# Patient Record
Sex: MALE | Race: WHITE | NOT HISPANIC OR LATINO | Employment: OTHER | URBAN - METROPOLITAN AREA
[De-identification: names, ages, dates, MRNs, and addresses within clinical notes are randomized per-mention and may not be internally consistent; named-entity substitution may affect disease eponyms.]

---

## 2017-08-29 ENCOUNTER — TRANSFERRED RECORDS (OUTPATIENT)
Dept: HEALTH INFORMATION MANAGEMENT | Facility: CLINIC | Age: 57
End: 2017-08-29

## 2017-09-13 ENCOUNTER — TRANSFERRED RECORDS (OUTPATIENT)
Dept: HEALTH INFORMATION MANAGEMENT | Facility: CLINIC | Age: 57
End: 2017-09-13

## 2018-09-27 ENCOUNTER — TRANSFERRED RECORDS (OUTPATIENT)
Dept: HEALTH INFORMATION MANAGEMENT | Facility: CLINIC | Age: 58
End: 2018-09-27

## 2018-10-23 ENCOUNTER — TRANSFERRED RECORDS (OUTPATIENT)
Dept: HEALTH INFORMATION MANAGEMENT | Facility: CLINIC | Age: 58
End: 2018-10-23

## 2019-08-18 ENCOUNTER — TRANSFERRED RECORDS (OUTPATIENT)
Dept: HEALTH INFORMATION MANAGEMENT | Facility: CLINIC | Age: 59
End: 2019-08-18

## 2019-09-11 ENCOUNTER — TRANSFERRED RECORDS (OUTPATIENT)
Dept: HEALTH INFORMATION MANAGEMENT | Facility: CLINIC | Age: 59
End: 2019-09-11

## 2019-09-12 ENCOUNTER — TRANSFERRED RECORDS (OUTPATIENT)
Dept: HEALTH INFORMATION MANAGEMENT | Facility: CLINIC | Age: 59
End: 2019-09-12

## 2020-01-17 ENCOUNTER — TRANSFERRED RECORDS (OUTPATIENT)
Dept: HEALTH INFORMATION MANAGEMENT | Facility: CLINIC | Age: 60
End: 2020-01-17

## 2020-01-17 ENCOUNTER — MEDICAL CORRESPONDENCE (OUTPATIENT)
Dept: HEALTH INFORMATION MANAGEMENT | Facility: CLINIC | Age: 60
End: 2020-01-17

## 2020-01-28 ENCOUNTER — TRANSCRIBE ORDERS (OUTPATIENT)
Dept: OTHER | Age: 60
End: 2020-01-28

## 2020-01-28 DIAGNOSIS — G62.9 NEUROPATHY: Primary | ICD-10-CM

## 2020-01-31 NOTE — TELEPHONE ENCOUNTER
RECORDS RECEIVED FROM: External   Date of Appt: 5/18/20   NOTES (FOR ALL VISITS) STATUS DETAILS   OFFICE NOTE from referring provider Care Everywhere Dr Tori Aguilar at [facility]:  1/28/20   OFFICE NOTE from other specialist Received Dr Hailey Gtz @ [facility]:  9/11/19 9/27/18    Dr Etienne Perez @ [facility] [place] [place] [facility]:  8/26/19   DISCHARGE SUMMARY from hospital N/A    DISCHARGE REPORT from the ER N/A    OPERATIVE REPORT N/A    MEDICATION LIST Care Everywhere    IMAGING  (FOR ALL VISITS)     EMG Received [facility]:  9/11/19  10/23/18    [facility]:  9/13/17   EEG N/A    MRI (HEAD, NECK, SPINE) Received [facility]:  MRI Lumbar Spine 8/18/19    [facility]:  MRI Cervical Spine 8/29/17   LUMBAR PUNCTURE N/A    [facility] Scan N/A    CT (HEAD, NECK, SPINE) N/A       Action 1/31/20 MV 9.21am   Action Taken Imaging request faxed to [facility] for:  MRI Lumbar Spine 8/18/19     Action 1/31/20 MV 9.21am   Action Taken Imaging request faxed to [facility] for:  MRI Cervical Spine 10/2/18     Phone Call:  1/31/20 MV 9.21am   Contact Name iNcolas García Left voice mail to obtain external medical records from [facility] (EMG)     Action 2/3/20 MV 12.36pm   Action Taken EMG report received via fax. Sent to scanning     Imaging Received  2/3/20 MV 12.38pm  [facility]   Image Type (x): Disc:   PACS: x   Exam Date/Name MRI Lumbar Spine 8/18/19 Comments: images resolved in PACS     Action 2/5/20 MV 8.39am   Action Taken 2nd imaging request faxed to [facility]. [facility] does not have imaging for the DOS listed above.     Imaging Received  2/5/20 MV 9.34am  [facility]   Image Type (x): Disc:   PACS: x   Exam Date/Name MRI Cervical Spine 8/29/17 Comments: images resolved in PACS

## 2020-04-22 ENCOUNTER — DOCUMENTATION ONLY (OUTPATIENT)
Dept: CARE COORDINATION | Facility: CLINIC | Age: 60
End: 2020-04-22

## 2020-05-18 ENCOUNTER — PRE VISIT (OUTPATIENT)
Dept: NEUROLOGY | Facility: CLINIC | Age: 60
End: 2020-05-18

## 2020-05-18 ENCOUNTER — VIRTUAL VISIT (OUTPATIENT)
Dept: NEUROLOGY | Facility: CLINIC | Age: 60
End: 2020-05-18
Attending: FAMILY MEDICINE
Payer: COMMERCIAL

## 2020-05-18 DIAGNOSIS — R20.2 PARESTHESIA OF HAND, BILATERAL: Primary | ICD-10-CM

## 2020-05-18 DIAGNOSIS — M54.12 RIGHT CERVICAL RADICULOPATHY: ICD-10-CM

## 2020-05-18 DIAGNOSIS — M75.112 NONTRAUMATIC PARTIAL BILATERAL ROTATOR CUFF TEAR: ICD-10-CM

## 2020-05-18 DIAGNOSIS — M75.111 NONTRAUMATIC PARTIAL BILATERAL ROTATOR CUFF TEAR: ICD-10-CM

## 2020-05-18 RX ORDER — TRAZODONE HYDROCHLORIDE 50 MG/1
100 TABLET, FILM COATED ORAL AT BEDTIME
COMMUNITY
Start: 2019-07-17

## 2020-05-18 RX ORDER — POTASSIUM CHLORIDE 750 MG/1
10 TABLET, EXTENDED RELEASE ORAL 2 TIMES DAILY
COMMUNITY
Start: 2019-10-03

## 2020-05-18 RX ORDER — FINASTERIDE 5 MG/1
2.5 TABLET, FILM COATED ORAL DAILY
COMMUNITY
Start: 2019-07-12

## 2020-05-18 RX ORDER — SERTRALINE HYDROCHLORIDE 100 MG/1
200 TABLET, FILM COATED ORAL DAILY
COMMUNITY
Start: 2020-01-28

## 2020-05-18 RX ORDER — ZOLPIDEM TARTRATE 5 MG/1
5 TABLET ORAL
COMMUNITY
Start: 2020-04-17 | End: 2020-08-12

## 2020-05-18 RX ORDER — PROPRANOLOL HCL 60 MG
60 CAPSULE, EXTENDED RELEASE 24HR ORAL DAILY
COMMUNITY
Start: 2019-10-03

## 2020-05-18 RX ORDER — LISINOPRIL AND HYDROCHLOROTHIAZIDE 12.5; 2 MG/1; MG/1
1 TABLET ORAL DAILY
COMMUNITY
Start: 2019-11-25

## 2020-05-18 RX ORDER — FOLIC ACID/MULTIVIT,IRON,MINER 0.4MG-18MG
1200 TABLET ORAL DAILY
COMMUNITY

## 2020-05-18 RX ORDER — FENOFIBRATE 160 MG/1
160 TABLET ORAL DAILY
COMMUNITY
Start: 2019-06-27 | End: 2020-11-10

## 2020-05-18 RX ORDER — ALPRAZOLAM 0.25 MG
0.25 TABLET ORAL 2 TIMES DAILY
COMMUNITY
Start: 2020-04-13

## 2020-05-18 RX ORDER — CAFFEINE 200 MG
200 TABLET ORAL DAILY
COMMUNITY

## 2020-05-18 RX ORDER — DEXTROAMPHETAMINE SACCHARATE, AMPHETAMINE ASPARTATE, DEXTROAMPHETAMINE SULFATE AND AMPHETAMINE SULFATE 2.5; 2.5; 2.5; 2.5 MG/1; MG/1; MG/1; MG/1
TABLET ORAL
COMMUNITY
Start: 2020-04-17

## 2020-05-18 NOTE — PROGRESS NOTES
"Nicolas Mason is a 59 year old male who is being evaluated via a billable video visit.      The patient has been notified of following:     \"This video visit will be conducted via a call between you and your physician/provider. We have found that certain health care needs can be provided without the need for an in-person physical exam.  This service lets us provide the care you need with a video conversation.  If a prescription is necessary we can send it directly to your pharmacy.  If lab work is needed we can place an order for that and you can then stop by our lab to have the test done at a later time.    Video visits are billed at different rates depending on your insurance coverage.  Please reach out to your insurance provider with any questions.    If during the course of the call the physician/provider feels a video visit is not appropriate, you will not be charged for this service.\"    Patient has given verbal consent for Video visit? Yes    How would you like to obtain your AVS? Laurenharhugo    Patient would like the video invitation sent by: Send to e-mail at: [email or web address]    Will anyone else be joining your video visit? No        Video-Visit Details    Type of service:  Video Visit    Video Start Time: 12.30 pm  Video End Time: 1.11 pm    Originating Location (pt. Location): Home    Distant Location (provider location):  [facility] NEUROLOGY     Platform used for Video Visit: Jovan Collado MD        "

## 2020-05-18 NOTE — PROGRESS NOTES
"Service Date: 2020      Jackie Guerrero MD   [facility]   [address] [place] [address]      Tori Aguilar MD   [address] [address]      RE: Nicolas Mason   MRN: [id number]   : 1960      Dear Doctors:      I had the pleasure to evaluate Nicolas Mason via a billable video visit today. In person visit was not recommended due to COVID19 pandemic and guidelines about social distancing. Doximity was used.      Mr. Mason is a 59-year-old male who wanted a third neurological opinion after he had multiple EMGs of the upper extremities and evaluation by multiple practitioners for predominantly upper extremity issues.  It was very difficult to get a linear history from Mr. Mason, as he kept on referring, again and again, to the results of his MRIs and EMGs without focusing on my inquiries about the timeline and detailed description of his symptoms, and therefore I had to constantly redirect him.    3 years ago, he had an accident on the treadmill when he fell down.  He landed on his right hip and thigh.  After the accident, he experienced pain or paresthesias emanating from the right neck and scapular area and radiating down the medial arm, forearm and digits 4-5. Lateral neck motion would aggravate this.  About a year later, he experienced what he called \"similar\" symptoms on the left side, although, upon further questioning, they were not the same.  He was describing pain from the left elbow with paresthesias down his left digit 5. Over time, the right-sided pain and paresthesias at the 5th finger dissipated. However, in the last year or two, he is bothered by a burning sensation and major discomfort in the right middle finger, and his whole right hand feels \"on fire\" at times, especially at night.  He wakes up and shakes his hand, but this does not provide significant relief. This latter symptom may be partially " alleviated by raising the arm overhead.  In addition, he has pain at the right elbow laterally, and he has chronic pain in the right more than left shoulder aggravated by passive motion.      Workup done at [place], [place] is as follows:     1. MRI of the shoulder done at [facility] in 09/2018 without contrast, showed moderate full thickness tear of the anterior portion of the distal right supraspinatus tendon, but no associated muscle atrophy.  There was moderate infraspinatus and subscapularis tendinopathy.    2. MRI of the lumbar spine done 08/2019 showed slight retrolisthesis in L5 and moderate spinal stenoses at L3-4 and L5-S1 with mild canal stenosis at L4-5.   3. MRI of the cervical spine done 08/2017 showed quite significant neural foraminal stenosis, severe at right C6-7, moderate at bilateral C4-5 and C5-6, and hypertrophic left C3-4 facet causing severe foraminal stenosis.  There was no spinal cord abnormality.  There was also a moderate compression of the right C8 root due to a 3 mm right medial foraminal C7-T1 disk herniation.    4. 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  There was also chronic mild to moderate right C7>C6 radiculopathies.    5. EMG #2 was done by Dr. Payton in [place] on 10/2018. There was no evidence of right ulnar neuropathy at the elbow. 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   6. A third EMG done in 09/2019, again at [place]. This was at the right lower extremity. Right peroneal and tibial nerves were normal.  Right sural sensory response was normal.  Needle EMG was unremarkable except for mild large long duration units at the right vastus  medialis that was interpreted as femoral neuropathy versus chronic right L4 radiculopathy.  The patient currently does not have any lower extremity complaints.      7. He told me that he had a fourth EMG at [place] of the LEFT upper extremity, the results of which I do not have.     IMPRESSION:  Mr. Mason provides a complex history and it is quite obvious that there is more than one issue in his upper extremities.  He very likely has orthopedic problems due to bilateral rotator cuff tears causing the shoulder pain, and those should be addressed by an orthopedic surgeon. Second, he may have right lateral epicondylitis as there is a tender point at the lateral elbow. Third, regarding neurological issues, I personally believe that his right middle finger paresthesias are most likely due to C7 radiculopathy, because overhead motion provides some partial relief, unlike what would happen with carpal tunnel.  Right C7 radiculopathy was actually previously documented both electrodiagnostically and on MRI.  He does not have any symptoms of right ulnar neuropathy or C8 radiculopathy anymore, because he is currently free of sensory symptoms at digits 4-5.  I do not know if there are any active neurological issues in the left arm.      Obviously, all those things cannot be sorted out by video exam, and therefore, I asked him to come to our clinic to undergo a repeat EMG in 1 month and this will provide me the opportunity to examine him too.  I hope this will provide some closure and the answers he needs.      Thank you for allowing me to participate in his care. Total time spent on video call 41 minutes; start was 12:30 p.m., end 1:11 p.m.; more than half was counseling.     Sincerely,            DAREK CHO MD             D: 2020   T: 2020   MT: kay      Name:     MARISELA MASON   MRN:      [id number]        Account:      [id number]   :      1960           Service Date: 2020       Document: [id number]

## 2020-06-18 ENCOUNTER — OFFICE VISIT (OUTPATIENT)
Dept: NEUROLOGY | Facility: CLINIC | Age: 60
End: 2020-06-18
Attending: PSYCHIATRY & NEUROLOGY
Payer: COMMERCIAL

## 2020-06-18 DIAGNOSIS — M77.11 LATERAL EPICONDYLITIS OF RIGHT ELBOW: ICD-10-CM

## 2020-06-18 DIAGNOSIS — M54.12 CERVICAL RADICULOPATHY AT C8: ICD-10-CM

## 2020-06-18 DIAGNOSIS — R20.2 PARESTHESIA OF HAND, BILATERAL: ICD-10-CM

## 2020-06-18 DIAGNOSIS — M54.12 C7 RADICULOPATHY: ICD-10-CM

## 2020-06-18 DIAGNOSIS — M65.331 ACQUIRED TRIGGER FINGER OF RIGHT MIDDLE FINGER: Primary | ICD-10-CM

## 2020-06-18 DIAGNOSIS — G56.21 ULNAR NEUROPATHY AT ELBOW, RIGHT: ICD-10-CM

## 2020-06-18 DIAGNOSIS — G56.03 BILATERAL CARPAL TUNNEL SYNDROME: ICD-10-CM

## 2020-06-18 NOTE — LETTER
6/18/2020       RE: Nicolas Mason  [address] [place] [id number]     Dear Colleague,    Thank you for referring your patient, Nicolas Mason, to the [facility] EMG at [facility]. Please see a copy of my visit note below.          [facility]  Electrodiagnostic Laboratory    Nerve Conduction & EMG Report          Patient:       Nicolas Mason  Patient ID:    [id number]  Gender:        Male  YOB: 1960  Age:           59 Years 10 Months        History & Examination:    59 year old man with multiple sites of pain and paresthesia in the right>left upper extremities. He previously experienced numbness and tingling at digits 4-5 of right>left hand, right medial forearm, and upper arm, aggravated by neck movements, clinically consistent with C8 radiculopathy. Similar but milder symptoms had occurred on the left. Apparently those paresthesias have now resolved. His current chief complaints are bilateral shoulder pain aggravated by passive motion (history of rotator cuff tear), pain at the right lateral elbow markedly aggravated by pressure (suggestive of epicondylitis), and snapping/clicking of the right middle finger upon flexion suggestive of trigger finger. He has had at least 3 prior EMG studies, documenting bilateral carpal tunnel syndrome, bilateral right>left C8 and to a lesser extent C7 radiculopathies. Previous studies have shown somewhat conflicting results with regard to right ulnar neuropathy at the elbow (one study positive, one negative for this diagnosis). Repeat EMG study requested by patient to clarify some of the above uncertainties.     Techniques: Motor and sensory conduction studies were done with surface recording electrodes. EMG was done with a concentric needle electrode.     Results:    Right median antidromic sensory NCS showed attenuated SNAP amplitude and conduction velocity. Left median antidromic sensory NCS showed normal  SNAP amplitude and attenuated conduction velocity. Bilateral ulnar antidromic sensory NCSs showed attenuated SNAP amplitudes; the attenuation on the right was minimal, on the left it was moderate. Right radial antidromic sensory NCS was normal. Bilateral median orthodromic mixed NCSs done with stimulation at the palm showed prolonged peak latencies. Bilateral ulnar orthodromic mixed NCSs were normal.   Bilateral median motor NCSs recorded from APB and second lumbricals showed prolonged distal latencies, right>left. Right ulnar motor NCS recorded from ADM showed normal distal latency and CMAP amplitudes, normal conduction velocity at the forearm, and attenuated conduction velocity at the elbow. Left ulnar motor NCSs recorded from FDI and ADM were normal. Bilateral ulnar motor NCSs recorded from the palmar interossei were normal. Left ulnar F-wave latencies were normal.     EMG showed 3+ fibs/PSWs at the right FDI, 2+ at the left FDI, and 1+ at the left FPL. Increased insertional activity was noted at the right C7 paraspinals, left EIP, and left pronator teres muscles. All other muscles sampled showed no abnormal insertional or spontaneous activity. EMG of bilateral FDI, FPL, right EIP, and right triceps showed some large, long duration MUPs with either moderately reduced (right FDI), mildly reduced (right EIP, bilateral FPL, left FDI), or normal (right triceps) recruitment patterns. EMG of the following muscles was normal: right pronator teres, bilateral deltoids, bilateral biceps, and left triceps.     Interpretation:    Abnormal study. 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    Comment: I had an extensive discussion of the results with the  patient. Importantly, his neurological examination of bilateral upper extremities is completely normal- there is no weakness, reflex abnormality, or sensory loss. He has negative Tinel and Phalen signs at the wrist, and no paresthesias at digits I-II to suggest carpal tunnel syndrome. His previously reported paresthesias at digits 4-5, which could be attributed to ulnar neuropathies or C8 radiculopathy, have now resolved. I explained to him that his current complaints of bilateral shoulder pain, right lateral elbow pain and right middle finger clicking/snapping are all most likely explained by orthopedic/MSK and not neurological problems, namely shoulder arthropathy, tennis elbow and trigger finger. While a number of peripheral nerve issues are unequivocally present, as summarized in the EMG interpretation, it is unlikely that any of them explains his current concerns. I took the liberty to refer him to one of our orthopedic doctors to address the above.     EMG Physician:    Hubert Collado MD       Sensory NCS      Nerve / Sites Rec. Site Onset Peak Ref. NP Amp Ref. PP Amp Dist Joby Ref. Temp     ms ms ms  V  V  V cm m/s m/s  C   R MEDIAN - Dig II Anti      Wrist Dig II 3.91 4.95  7.4 10.0 18.1 14 35.8 48.0 34   L MEDIAN - Dig II Anti      Wrist Dig II 3.91 4.74  14.5 10.0 31.2 14 35.8 48.0 33.5   R ULNAR - Dig V Anti      Wrist Dig V 2.40 3.28  7.3 8.0 10.7 12.5 52.2 48.0 33.9   L ULNAR - Dig V Anti      Wrist Dig V 2.40 2.92  4.7 8.0 5.8 12.5 52.2 48.0 33.8   R RADIAL - Snuff      Forearm Snuff 1.88 2.34  19.4 15.0 29.0 10.5 56.0 48.0 33.6   R MEDIAN - Ulnar - Palmar      Median Wrist 2.97 3.85 2.40 8.2  13.9 8 26.9  34      Ulnar Wrist 1.61 2.19 2.40 14.7  10.9 8 49.5  34.1   L MEDIAN - Ulnar - Palmar      Median Wrist 2.29 3.02 2.40 37.3  51.2 8 34.9  34.3      Ulnar Wrist 1.46 2.03 2.40 4.8  8.1 8 54.9  32.7       Motor NCS      Nerve / Sites Rec. Site Lat Ref. Amp Ref. Rel Amp Dist Joby Ref. Dur. Area  Temp.     ms ms mV mV % cm m/s m/s ms %  C   R MEDIAN - APB      Wrist APB 6.88 4.40 5.6 5.0 100 8   6.82 100 34.4      Elbow APB 11.67  5.3  94.5 23 48.0 48.0 6.77 91.1 34.4   L MEDIAN - APB      Wrist APB 5.42 4.40 5.8 5.0 100 8   6.15 100 33.4      Elbow APB 9.95  5.3  91.2 24 53.0 48.0 5.99 89.1 33.6   R ULNAR - ADM      Wrist ADM 2.66 3.50 11.0 5.0 100 8   5.99 100 34.3      B.Elbow ADM 6.41  10.7  96.6 21 56.0 48.0 5.99 97.1 34.3      A.Elbow ADM 8.44  10.8  97.7 8 39.4 48.0 5.68 98.2 34.3   L ULNAR - ADM      Wrist ADM 2.81 3.50 12.8 5.0 100 8   6.30 100 33.6      B.Elbow ADM 6.82  12.4  96.5 21 52.4 48.0 6.51 99.6 33.6      A.Elbow ADM 8.70  12.1  94.2 9 48.0 48.0 6.56 97.7 33.6   R MEDIAN - II Lumb      Median II Lumb 4.95  1.1  100 10   7.24 100 34      Ulnar Palm Int 3.23  2.4  217 10   4.95 321 34   L MEDIAN - II Lumb      Median II Lumb 4.38  1.6  100 10   6.93 100 34.1      Ulnar Palm Int 3.33  2.2  136 10   5.05 77.2 34.1   L ULNAR - FDI      Wrist FDI 3.54  15.8  100    5.10 100 33.9      B.Elbow FDI 7.76  14.5  91.3 21 49.8  5.26 99 33.8      A.Elbow FDI 9.58  13.9  87.7 9 49.4  5.31 96 33.9       F  Wave      Nerve Min F Lat Max F Lat Mean FLat Temp.    ms ms ms  C   L ULNAR 30.94 35.31 32.80 33.4       EMG Summary Table     Spontaneous MUAP Recruitment    IA Fib/PSW Fasc H.F. Amp Dur. PPP Pattern   R. FIRST D INTEROSS N 3+ None None 2+ 1+ N Moderately Reduced   R. EXT INDICIS N None None None 2+ 1+ N Mildly Reduced   R. FLEX POLL LONG N None None None 1+ 1+ N Mildly Reduced   R. TRICEPS N None None None 2+ 1+ N N   R. PRON TERES N None None None N N N N   R. DELTOID N None None None N N N N   R. BICEPS N None None None N N N N   R. C7 PSP Increased None None None N N N N   L. FIRST D INTEROSS N 2+ None None 1+ 1+ N Mildly Reduced   L. EXT INDICIS Increased None None None N N N N   L. FLEX POLL LONG Increased 1+ None None 1+ 1+ N Mildly Reduced   L. TRICEPS N None None None N N N N   L. DELTOID N  None None None N N N N   L. PRON TERES Increased None None None N N N N                                            Again, thank you for allowing me to participate in the care of your patient.      Sincerely,    Hubert Collado MD

## 2020-06-18 NOTE — PROGRESS NOTES
[facility]  Electrodiagnostic Laboratory    Nerve Conduction & EMG Report          Patient:       Nciolas Mason  Patient ID:    [id number]  Gender:        Male  YOB: 1960  Age:           59 Years 10 Months        History & Examination:    59 year old man with multiple sites of pain and paresthesia in the right>left upper extremities. He previously experienced numbness and tingling at digits 4-5 of right>left hand, right medial forearm, and upper arm, aggravated by neck movements, clinically consistent with C8 radiculopathy. Similar but milder symptoms had occurred on the left. Apparently those paresthesias have now resolved. His current chief complaints are bilateral shoulder pain aggravated by passive motion (history of rotator cuff tear), pain at the right lateral elbow markedly aggravated by pressure (suggestive of epicondylitis), and snapping/clicking of the right middle finger upon flexion suggestive of trigger finger. He has had at least 3 prior EMG studies, documenting bilateral carpal tunnel syndrome, bilateral right>left C8 and to a lesser extent C7 radiculopathies. Previous studies have shown somewhat conflicting results with regard to right ulnar neuropathy at the elbow (one study positive, one negative for this diagnosis). Repeat EMG study requested by patient to clarify some of the above uncertainties.     Techniques: Motor and sensory conduction studies were done with surface recording electrodes. EMG was done with a concentric needle electrode.     Results:    Right median antidromic sensory NCS showed attenuated SNAP amplitude and conduction velocity. Left median antidromic sensory NCS showed normal SNAP amplitude and attenuated conduction velocity. Bilateral ulnar antidromic sensory NCSs showed attenuated SNAP amplitudes; the attenuation on the right was minimal, on the left it was moderate. Right radial antidromic sensory NCS was normal. Bilateral median orthodromic  mixed NCSs done with stimulation at the palm showed prolonged peak latencies. Bilateral ulnar orthodromic mixed NCSs were normal.   Bilateral median motor NCSs recorded from APB and second lumbricals showed prolonged distal latencies, right>left. Right ulnar motor NCS recorded from ADM showed normal distal latency and CMAP amplitudes, normal conduction velocity at the forearm, and attenuated conduction velocity at the elbow. Left ulnar motor NCSs recorded from FDI and ADM were normal. Bilateral ulnar motor NCSs recorded from the palmar interossei were normal. Left ulnar F-wave latencies were normal.     EMG showed 3+ fibs/PSWs at the right FDI, 2+ at the left FDI, and 1+ at the left FPL. Increased insertional activity was noted at the right C7 paraspinals, left EIP, and left pronator teres muscles. All other muscles sampled showed no abnormal insertional or spontaneous activity. EMG of bilateral FDI, FPL, right EIP, and right triceps showed some large, long duration MUPs with either moderately reduced (right FDI), mildly reduced (right EIP, bilateral FPL, left FDI), or normal (right triceps) recruitment patterns. EMG of the following muscles was normal: right pronator teres, bilateral deltoids, bilateral biceps, and left triceps.     Interpretation:    Abnormal study. 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    Comment: I had an extensive discussion of the results with the patient. Importantly, his neurological examination of bilateral upper extremities is completely normal- there is no weakness, reflex abnormality, or sensory loss. He has negative Tinel and Phalen signs at the wrist, and no paresthesias at digits I-II to suggest carpal tunnel  syndrome. His previously reported paresthesias at digits 4-5, which could be attributed to ulnar neuropathies or C8 radiculopathy, have now resolved. I explained to him that his current complaints of bilateral shoulder pain, right lateral elbow pain and right middle finger clicking/snapping are all most likely explained by orthopedic/MSK and not neurological problems, namely shoulder arthropathy, tennis elbow and trigger finger. While a number of peripheral nerve issues are unequivocally present, as summarized in the EMG interpretation, it is unlikely that any of them explains his current concerns. I took the liberty to refer him to one of our orthopedic doctors to address the above.     EMG Physician:    Hubert Collado MD       Sensory NCS      Nerve / Sites Rec. Site Onset Peak Ref. NP Amp Ref. PP Amp Dist Joby Ref. Temp     ms ms ms  V  V  V cm m/s m/s  C   R MEDIAN - Dig II Anti      Wrist Dig II 3.91 4.95  7.4 10.0 18.1 14 35.8 48.0 34   L MEDIAN - Dig II Anti      Wrist Dig II 3.91 4.74  14.5 10.0 31.2 14 35.8 48.0 33.5   R ULNAR - Dig V Anti      Wrist Dig V 2.40 3.28  7.3 8.0 10.7 12.5 52.2 48.0 33.9   L ULNAR - Dig V Anti      Wrist Dig V 2.40 2.92  4.7 8.0 5.8 12.5 52.2 48.0 33.8   R RADIAL - Snuff      Forearm Snuff 1.88 2.34  19.4 15.0 29.0 10.5 56.0 48.0 33.6   R MEDIAN - Ulnar - Palmar      Median Wrist 2.97 3.85 2.40 8.2  13.9 8 26.9  34      Ulnar Wrist 1.61 2.19 2.40 14.7  10.9 8 49.5  34.1   L MEDIAN - Ulnar - Palmar      Median Wrist 2.29 3.02 2.40 37.3  51.2 8 34.9  34.3      Ulnar Wrist 1.46 2.03 2.40 4.8  8.1 8 54.9  32.7       Motor NCS      Nerve / Sites Rec. Site Lat Ref. Amp Ref. Rel Amp Dist Joby Ref. Dur. Area Temp.     ms ms mV mV % cm m/s m/s ms %  C   R MEDIAN - APB      Wrist APB 6.88 4.40 5.6 5.0 100 8   6.82 100 34.4      Elbow APB 11.67  5.3  94.5 23 48.0 48.0 6.77 91.1 34.4   L MEDIAN - APB      Wrist APB 5.42 4.40 5.8 5.0 100 8   6.15 100 33.4      Elbow APB 9.95  5.3  91.2  24 53.0 48.0 5.99 89.1 33.6   R ULNAR - ADM      Wrist ADM 2.66 3.50 11.0 5.0 100 8   5.99 100 34.3      B.Elbow ADM 6.41  10.7  96.6 21 56.0 48.0 5.99 97.1 34.3      A.Elbow ADM 8.44  10.8  97.7 8 39.4 48.0 5.68 98.2 34.3   L ULNAR - ADM      Wrist ADM 2.81 3.50 12.8 5.0 100 8   6.30 100 33.6      B.Elbow ADM 6.82  12.4  96.5 21 52.4 48.0 6.51 99.6 33.6      A.Elbow ADM 8.70  12.1  94.2 9 48.0 48.0 6.56 97.7 33.6   R MEDIAN - II Lumb      Median II Lumb 4.95  1.1  100 10   7.24 100 34      Ulnar Palm Int 3.23  2.4  217 10   4.95 321 34   L MEDIAN - II Lumb      Median II Lumb 4.38  1.6  100 10   6.93 100 34.1      Ulnar Palm Int 3.33  2.2  136 10   5.05 77.2 34.1   L ULNAR - FDI      Wrist FDI 3.54  15.8  100    5.10 100 33.9      B.Elbow FDI 7.76  14.5  91.3 21 49.8  5.26 99 33.8      A.Elbow FDI 9.58  13.9  87.7 9 49.4  5.31 96 33.9       F  Wave      Nerve Min F Lat Max F Lat Mean FLat Temp.    ms ms ms  C   L ULNAR 30.94 35.31 32.80 33.4       EMG Summary Table     Spontaneous MUAP Recruitment    IA Fib/PSW Fasc H.F. Amp Dur. PPP Pattern   R. FIRST D INTEROSS N 3+ None None 2+ 1+ N Moderately Reduced   R. EXT INDICIS N None None None 2+ 1+ N Mildly Reduced   R. FLEX POLL LONG N None None None 1+ 1+ N Mildly Reduced   R. TRICEPS N None None None 2+ 1+ N N   R. PRON TERES N None None None N N N N   R. DELTOID N None None None N N N N   R. BICEPS N None None None N N N N   R. C7 PSP Increased None None None N N N N   L. FIRST D INTEROSS N 2+ None None 1+ 1+ N Mildly Reduced   L. EXT INDICIS Increased None None None N N N N   L. FLEX POLL LONG Increased 1+ None None 1+ 1+ N Mildly Reduced   L. TRICEPS N None None None N N N N   L. DELTOID N None None None N N N N   L. PRON TERES Increased None None None N N N N

## 2020-06-25 NOTE — TELEPHONE ENCOUNTER
DIAGNOSIS: Right middle finger trigger finger   APPOINTMENT DATE: 7/1/20   NOTES STATUS DETAILS   OFFICE NOTE from referring provider Internal    OFFICE NOTE from other specialist Internal    DISCHARGE SUMMARY from hospital N/A    DISCHARGE REPORT from the ER N/A    OPERATIVE REPORT N/A    MEDICATION LIST Internal    MRI N/A    CT SCAN N/A    XRAYS (IMAGES & REPORTS) N/A

## 2020-07-01 ENCOUNTER — PRE VISIT (OUTPATIENT)
Dept: ORTHOPEDICS | Facility: CLINIC | Age: 60
End: 2020-07-01

## 2020-07-01 ENCOUNTER — OFFICE VISIT (OUTPATIENT)
Dept: ORTHOPEDICS | Facility: CLINIC | Age: 60
End: 2020-07-01
Payer: COMMERCIAL

## 2020-07-01 VITALS — WEIGHT: 175 LBS | HEIGHT: 70 IN | BODY MASS INDEX: 25.05 KG/M2

## 2020-07-01 DIAGNOSIS — M77.11 LATERAL EPICONDYLITIS OF RIGHT ELBOW: ICD-10-CM

## 2020-07-01 DIAGNOSIS — M65.331 ACQUIRED TRIGGER FINGER OF RIGHT MIDDLE FINGER: ICD-10-CM

## 2020-07-01 RX ORDER — TRIAMCINOLONE ACETONIDE 40 MG/ML
40 INJECTION, SUSPENSION INTRA-ARTICULAR; INTRAMUSCULAR
Status: SHIPPED | OUTPATIENT
Start: 2020-07-01

## 2020-07-01 RX ORDER — LIDOCAINE HYDROCHLORIDE 10 MG/ML
1 INJECTION, SOLUTION EPIDURAL; INFILTRATION; INTRACAUDAL; PERINEURAL
Status: SHIPPED | OUTPATIENT
Start: 2020-07-01

## 2020-07-01 RX ADMIN — TRIAMCINOLONE ACETONIDE 40 MG: 40 INJECTION, SUSPENSION INTRA-ARTICULAR; INTRAMUSCULAR at 14:58

## 2020-07-01 RX ADMIN — LIDOCAINE HYDROCHLORIDE 1 ML: 10 INJECTION, SOLUTION EPIDURAL; INFILTRATION; INTRACAUDAL; PERINEURAL at 14:58

## 2020-07-01 RX ADMIN — TRIAMCINOLONE ACETONIDE 40 MG: 40 INJECTION, SUSPENSION INTRA-ARTICULAR; INTRAMUSCULAR at 15:01

## 2020-07-01 RX ADMIN — LIDOCAINE HYDROCHLORIDE 1 ML: 10 INJECTION, SOLUTION EPIDURAL; INFILTRATION; INTRACAUDAL; PERINEURAL at 15:01

## 2020-07-01 ASSESSMENT — PATIENT HEALTH QUESTIONNAIRE - PHQ9
SUM OF ALL RESPONSES TO PHQ QUESTIONS 1-9: 25
10. IF YOU CHECKED OFF ANY PROBLEMS, HOW DIFFICULT HAVE THESE PROBLEMS MADE IT FOR YOU TO DO YOUR WORK, TAKE CARE OF THINGS AT HOME, OR GET ALONG WITH OTHER PEOPLE: EXTREMELY DIFFICULT
SUM OF ALL RESPONSES TO PHQ QUESTIONS 1-9: 25

## 2020-07-01 ASSESSMENT — MIFFLIN-ST. JEOR: SCORE: 1605.04

## 2020-07-01 NOTE — NURSING NOTE
"Reason For Visit:   Chief Complaint   Patient presents with     Consult     patient reports to have right elbow pain. He is not really concerned about his triggering finger        Primary MD: Jackie Guerrero  Ref. MD: Hubert Collado    Age: 59 year old    ?  No      Ht 1.778 m (5' 10\")   Wt 79.4 kg (175 lb)   BMI 25.11 kg/m        Pain Assessment  Patient Currently in Pain: Yes  0-10 Pain Scale: 2(can get up to 6-7/10 with lifting a waterbottle.)  Primary Pain Location: Elbow      QuickDASH Assessment  No flowsheet data found.       Current Outpatient Medications   Medication Sig Dispense Refill     ALPRAZolam (XANAX) 0.25 MG tablet Take 0.25 mg by mouth 2 times daily       amphetamine-dextroamphetamine (ADDERALL) 10 MG tablet Take 2 tabs in the morning and 1 tab at noon       aspirin (ASA) 81 MG EC tablet Take 81 mg by mouth daily       caffeine (NO-DOZE) 200 MG TABS tablet Take 200 mg by mouth daily       fenofibrate (TRIGLIDE/LOFIBRA) 160 MG tablet Take 160 mg by mouth daily       finasteride (PROSCAR) 5 MG tablet Take 2.5 mg by mouth daily       lisinopril-hydrochlorothiazide (ZESTORETIC) 20-12.5 MG tablet Take 1 tablet by mouth daily       Multiple Vitamins-Minerals (MULTIVITAMIN ADULT PO) Take 1 tablet by mouth daily       Omega-3 Fatty Acids (OMEGA-3 FISH OIL) 1200 MG CAPS Take 1,200 mg by mouth daily       potassium chloride ER (KLOR-CON M) 10 MEQ CR tablet Take 10 mEq by mouth 2 times daily       propranolol ER (INDERAL LA) 60 MG 24 hr capsule Take 60 mg by mouth daily       sertraline (ZOLOFT) 100 MG tablet Take 200 mg by mouth daily       traZODone (DESYREL) 50 MG tablet Take 100 mg by mouth At Bedtime       zolpidem (AMBIEN) 5 MG tablet Take 5 mg by mouth nightly as needed         Allergies   Allergen Reactions     Atorvastatin GI Disturbance     Sulfa Drugs Rash     Groin RAsh       Shabana Rudd, ATC    "

## 2020-07-01 NOTE — LETTER
7/1/2020     RE: Nicolas Mason  [address] [place] [id number]    Dear Colleague,    Thank you for referring your patient, Nicolas Mason, to the [facility]. Please see a copy of my visit note below.    Date of Service: Jul 1, 2020    Chief Complaint: Multiple right upper extremity complaints      History of Present Illness: Nicolas Mason is a 59 year old, right handed male who presents today for further evaluation of his right upper extremity. Patient comes in with right elbow pain. He has had this for several months. It is located over the lateral elbow. Pain is made worse with wrist extension. Additionally patient has pain in the right middle finger with catching and popping in the right middle finger worse in the morning.  There is a similar feeling in the thumb.  Patient has never had treatment of this either. Finally the patient has been having some numbness in the medial nerve distribution which is worse at night time.     Review of Systems: A 14-point review of systems was obtained on the intake form and scanned into the medical record.      Past Medical History:  No past medical history on file.    Past Surgical History:  No past surgical history on file.     MEDICATIONS:     Current Outpatient Medications:      ALPRAZolam (XANAX) 0.25 MG tablet, Take 0.25 mg by mouth 2 times daily, Disp: , Rfl:      amphetamine-dextroamphetamine (ADDERALL) 10 MG tablet, Take 2 tabs in the morning and 1 tab at noon, Disp: , Rfl:      aspirin (ASA) 81 MG EC tablet, Take 81 mg by mouth daily, Disp: , Rfl:      caffeine (NO-DOZE) 200 MG TABS tablet, Take 200 mg by mouth daily, Disp: , Rfl:      fenofibrate (TRIGLIDE/LOFIBRA) 160 MG tablet, Take 160 mg by mouth daily, Disp: , Rfl:      finasteride (PROSCAR) 5 MG tablet, Take 2.5 mg by mouth daily, Disp: , Rfl:      lisinopril-hydrochlorothiazide (ZESTORETIC) 20-12.5 MG tablet, Take 1 tablet by mouth daily, Disp: , Rfl:      Multiple  "Vitamins-Minerals (MULTIVITAMIN ADULT PO), Take 1 tablet by mouth daily, Disp: , Rfl:      Omega-3 Fatty Acids (OMEGA-3 FISH OIL) 1200 MG CAPS, Take 1,200 mg by mouth daily, Disp: , Rfl:      potassium chloride ER (KLOR-CON M) 10 MEQ CR tablet, Take 10 mEq by mouth 2 times daily, Disp: , Rfl:      propranolol ER (INDERAL LA) 60 MG 24 hr capsule, Take 60 mg by mouth daily, Disp: , Rfl:      sertraline (ZOLOFT) 100 MG tablet, Take 200 mg by mouth daily, Disp: , Rfl:      traZODone (DESYREL) 50 MG tablet, Take 100 mg by mouth At Bedtime, Disp: , Rfl:      zolpidem (AMBIEN) 5 MG tablet, Take 5 mg by mouth nightly as needed, Disp: , Rfl:     ALLERGIES:  Allergies   Allergen Reactions     Atorvastatin GI Disturbance     Sulfa Drugs Rash     Groin RAsh     Social History:  Patient lives with wife.  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  Negative tobacco use.  Negative alcohol use.      Family History:  Negative for bleeding or clotting disorders or adverse reactions to anesthesia.    Physical examination:  VITALS: Ht 1.778 m (5' 10\")   Wt 79.4 kg (175 lb)   BMI 25.11 kg/m    GENERAL: Healthy-appearing adult male in no acute distress.  Alert and oriented times three.  HEENT: Head normocephalic and atraumatic.  Extra-ocular movements intact.  Neck: Full range of motion without pain.  Respiratory: Breathing regular and non-labored.    Right upper extremity: Full shoulder, elbow, forearm, and wrist range of motion.  Tenderness over the lateral epicondyle and pain with resisted wrist extension. Noted triggering in the right middle finger and thumb. Carpal Compression Test: Positive, Phalen test positive. Negative Tinnel test. SILT in all nerve distributions. Hand warm and well perfused     Left upper extremity: Full shoulder, elbow, forearm, and wrist range of motion. No focal points of tenderness. Full strength in wrist flexors, extensors, FPL, EPL     Carpal Compression Test: Positive, Phalen test positive. " Negative Tinnel test.     Skin: Intact without any rashes or abrasions.    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  1. Right lateral epicondylitis   - Physical therapy (patient will use is previous therapist for his back)   - Diclofenac (to be purchased over the counter)     2. Right middle and thumb trigger digits    - Corticosteroid injections provided     3. Right carpal tunnel    - Night splint     Patient will follow up with Dr. Gutierres in 6 weeks to discuss progress.     Staff: Bhavik Frazier MD   Orthopaedic Surgery, PGY-4    Procedure:   After written informed consent was obtained, the patient's right hand was prepped with chloraprep.  A combination 1 ml of betamethasone and 1 ml of lidocaine were injected into the right middle finger at the A1 pulley area and then the thumb at the A1 pulley area.  There were no immediate complications.    IJuancarlos, saw and evaluated this patient with the resident and agree with the resident s findings and plan of care as documented in the resident s note.  I was present and supervised both above injections in their entirety.        Hand / Upper Extremity Injection/Arthrocentesis: R thumb A1    Date/Time: 7/1/2020 2:58 PM  Performed by: Juancarlos Gutierres MD  Authorized by: Juancarlos Gutierres MD     Indications:  Pain  Needle Size:  27 G  Guidance: landmark    Condition: trigger finger    Location:  Thumb    Site:  R thumb A1  Medications:  40 mg triamcinolone 40 MG/ML; 1 mL lidocaine (PF) 1 %  Outcome:  Tolerated well, no immediate complications  Procedure discussed: discussed risks,  benefits, and alternatives    Consent Given by:  Patient  Timeout: timeout called immediately prior to procedure    Prep: patient was prepped and draped in usual sterile fashion          [facility] [place] [id number] [address] [id number] [phone]  Dept: [phone]  ______________________________________________________________________________    Patient: Nicolas Mason   : 1960   MRN: [id number]   2020    INVASIVE PROCEDURE SAFETY CHECKLIST    Date: 2020   Procedure: right thumb trigger finger injection  Patient Name: Nicolas Mason  MRN: [id number]  YOB: 1960    Action: Complete sections as appropriate. Any discrepancy results in a HARD COPY until resolved.     PRE PROCEDURE:  Patient ID verified with 2 identifiers (name and  or MRN): Yes  Procedure and site verified with patient/designee (when able): Yes  Accurate consent documentation in medical record: Yes  H&P (or appropriate assessment) documented in medical record: NA  H&P must be up to 20 days prior to procedure and updates within 24 hours of procedure as applicable: NA  Relevant diagnostic and radiology test results appropriately labeled and displayed as applicable: Yes  Procedure site(s) marked with provider initials: Yes    TIMEOUT:  Time-Out performed immediately prior to starting procedure, including verbal and active participation of all team members addressing the following:Yes  * Correct patient identify  * Confirmed that the correct side and site are marked  * An accurate procedure consent form  * Agreement on the procedure to be done  * Correct patient position  * Relevant images and results are properly labeled and appropriately displayed  * The need to administer antibiotics or fluids for irrigation purposes during the procedure as applicable   * Safety precautions based on patient history or medication use    DURING PROCEDURE: Verification of correct person, site,  and procedures any time the responsibility for care of the patient is transferred to another member of the care team.       The following medications were given:         Prior to injection, verified patient identity using patient's name and date of birth.  Due to injection administration, patient instructed to remain in clinic for 15 minutes  afterwards, and to report any adverse reaction to me immediately.    Trigger point injection was performed.   Medication Name: lidocaine NDC 10310-211-54  Drug Amount Wasted:  Yes: 3 mg/ml   Vial/Syringe: Single dose vial  Expiration Date:      Medication Name Kenalog NDC [id number]    Scribed by Shabana Rudd ATC for Dr. Gutierres on 2020 at 1500 based on the provider's   statements to me.     Shabana Rudd ATC        Hand / Upper Extremity Injection/Arthrocentesis: L ring A1    Date/Time: 2020 3:01 PM  Performed by: Juancarlos Gutierres MD  Authorized by: Juancarlos Gutierres MD     Indications:  Pain  Needle Size:  27 G  Guidance: landmark    Condition: trigger finger    Location:  Ring finger    Site:  L ring A1  Medications:  1 mL lidocaine (PF) 1 %; 40 mg triamcinolone 40 MG/ML  Outcome:  Tolerated well, no immediate complications  Procedure discussed: discussed risks, benefits, and alternatives    Consent Given by:  Patient  Timeout: timeout called immediately prior to procedure    Prep: patient was prepped and draped in usual sterile fashion      [facility]  [address] [place] [id number]  [phone]  Dept: [phone]  ______________________________________________________________________________    Patient: Nicolas Mason   : 1960   MRN: [id number]   2020    INVASIVE PROCEDURE SAFETY CHECKLIST    Date: 2020   Procedure: right middle trigger finger injection  Patient Name: Nicolas Mason  MRN: [id number]  YOB: 1960    Action: Complete sections as appropriate. Any  discrepancy results in a HARD COPY until resolved.     PRE PROCEDURE:  Patient ID verified with 2 identifiers (name and  or MRN): Yes  Procedure and site verified with patient/designee (when able): Yes  Accurate consent documentation in medical record: Yes  H&P (or appropriate assessment) documented in medical record: NA  H&P must be up to 20 days prior to procedure and updates within 24 hours of procedure as applicable: NA  Relevant diagnostic and radiology test results appropriately labeled and displayed as applicable: Yes  Procedure site(s) marked with provider initials: Yes    TIMEOUT:  Time-Out performed immediately prior to starting procedure, including verbal and active participation of all team members addressing the following:Yes  * Correct patient identify  * Confirmed that the correct side and site are marked  * An accurate procedure consent form  * Agreement on the procedure to be done  * Correct patient position  * Relevant images and results are properly labeled and appropriately displayed  * The need to administer antibiotics or fluids for irrigation purposes during the procedure as applicable   * Safety precautions based on patient history or medication use    DURING PROCEDURE: Verification of correct person, site, and procedures any time the responsibility for care of the patient is transferred to another member of the care team.       The following medications were given:         Prior to injection, verified patient identity using patient's name and date of birth.  Due to injection administration, patient instructed to remain in clinic for 15 minutes  afterwards, and to report any adverse reaction to me immediately.    Trigger point injection was performed.   Medication Name: lidocaine NDC 75705-868-37  Drug Amount Wasted:  Yes: 3 mg/ml   Vial/Syringe: Single dose vial  Expiration Date:      Medication Name Kenalog NDC [id number]    Scribed by Shabana Rudd ATC for Dr. Gutierres on   2020 at 1500 based on the provider's   statements to me.     Shabana Rudd, AT    Again, thank you for allowing me to participate in the care of your patient.      Sincerely,    Juancarlos Gutierres MD

## 2020-07-01 NOTE — PROGRESS NOTES
Hand / Upper Extremity Injection/Arthrocentesis: R long A1    Date/Time: 2020 3:01 PM  Performed by: Juancarlos Gutierres MD  Authorized by: Juancarlos Gutierres MD     Indications:  Pain  Needle Size:  27 G  Guidance: landmark    Condition: trigger finger    Location:  Long finger    Site:  R long A1  Medications:  1 mL lidocaine (PF) 1 %; 40 mg triamcinolone 40 MG/ML  Outcome:  Tolerated well, no immediate complications  Procedure discussed: discussed risks, benefits, and alternatives    Consent Given by:  Patient  Timeout: timeout called immediately prior to procedure    Prep: patient was prepped and draped in usual sterile fashion      [facility] [place] [id number] [id number] [phone]  Dept: [phone]  ______________________________________________________________________________    Patient: Nicolas Mason   : 1960   MRN: [id number]   2020    INVASIVE PROCEDURE SAFETY CHECKLIST    Date: 2020   Procedure: right middle trigger finger injection  Patient Name: Nicolas Mason  MRN: [id number]  YOB: 1960    Action: Complete sections as appropriate. Any discrepancy results in a HARD COPY until resolved.     PRE PROCEDURE:  Patient ID verified with 2 identifiers (name and  or MRN): Yes  Procedure and site verified with patient/designee (when able): Yes  Accurate consent documentation in medical record: Yes  H&P (or appropriate assessment) documented in medical record: NA  H&P must be up to 20 days prior to procedure and updates within 24 hours of procedure as applicable: NA  Relevant diagnostic and radiology test results appropriately labeled and displayed as applicable: Yes  Procedure site(s) marked with provider initials: Yes    TIMEOUT:  Time-Out performed immediately prior to starting procedure, including verbal and active participation of all team members addressing the following:Yes  * Correct patient identify  *  Confirmed that the correct side and site are marked  * An accurate procedure consent form  * Agreement on the procedure to be done  * Correct patient position  * Relevant images and results are properly labeled and appropriately displayed  * The need to administer antibiotics or fluids for irrigation purposes during the procedure as applicable   * Safety precautions based on patient history or medication use    DURING PROCEDURE: Verification of correct person, site, and procedures any time the responsibility for care of the patient is transferred to another member of the care team.       The following medications were given:         Prior to injection, verified patient identity using patient's name and date of birth.  Due to injection administration, patient instructed to remain in clinic for 15 minutes  afterwards, and to report any adverse reaction to me immediately.    Trigger point injection was performed.   Medication Name: lidocaine NDC 92406-181-19  Drug Amount Wasted:  Yes: 3 mg/ml   Vial/Syringe: Single dose vial  Expiration Date:  11/23    Medication Name Kenalog NDC [id number]    Scribed by Shabana Rudd ATC for Dr. Gutierres on July 1, 2020 at 1500 based on the provider's   statements to me.     Shabana Rudd AT

## 2020-07-01 NOTE — PROGRESS NOTES
Date of Service: Jul 1, 2020    Chief Complaint: Multiple right upper extremity complaints      History of Present Illness: Nicolas Mason is a 59 year old, right handed male who presents today for further evaluation of his right upper extremity. Patient comes in with right elbow pain. He has had this for several months. It is located over the lateral elbow. Pain is made worse with wrist extension. Additionally patient has pain in the right middle finger with catching and popping in the right middle finger worse in the morning.  There is a similar feeling in the thumb.  Patient has never had treatment of this either. Finally the patient has been having some numbness in the medial nerve distribution which is worse at night time.     Review of Systems: A 14-point review of systems was obtained on the intake form and scanned into the medical record.      Past Medical History:  No past medical history on file.    Past Surgical History:  No past surgical history on file.     MEDICATIONS:     Current Outpatient Medications:      ALPRAZolam (XANAX) 0.25 MG tablet, Take 0.25 mg by mouth 2 times daily, Disp: , Rfl:      amphetamine-dextroamphetamine (ADDERALL) 10 MG tablet, Take 2 tabs in the morning and 1 tab at noon, Disp: , Rfl:      aspirin (ASA) 81 MG EC tablet, Take 81 mg by mouth daily, Disp: , Rfl:      caffeine (NO-DOZE) 200 MG TABS tablet, Take 200 mg by mouth daily, Disp: , Rfl:      fenofibrate (TRIGLIDE/LOFIBRA) 160 MG tablet, Take 160 mg by mouth daily, Disp: , Rfl:      finasteride (PROSCAR) 5 MG tablet, Take 2.5 mg by mouth daily, Disp: , Rfl:      lisinopril-hydrochlorothiazide (ZESTORETIC) 20-12.5 MG tablet, Take 1 tablet by mouth daily, Disp: , Rfl:      Multiple Vitamins-Minerals (MULTIVITAMIN ADULT PO), Take 1 tablet by mouth daily, Disp: , Rfl:      Omega-3 Fatty Acids (OMEGA-3 FISH OIL) 1200 MG CAPS, Take 1,200 mg by mouth daily, Disp: , Rfl:      potassium chloride ER (KLOR-CON M) 10 MEQ CR tablet,  "Take 10 mEq by mouth 2 times daily, Disp: , Rfl:      propranolol ER (INDERAL LA) 60 MG 24 hr capsule, Take 60 mg by mouth daily, Disp: , Rfl:      sertraline (ZOLOFT) 100 MG tablet, Take 200 mg by mouth daily, Disp: , Rfl:      traZODone (DESYREL) 50 MG tablet, Take 100 mg by mouth At Bedtime, Disp: , Rfl:      zolpidem (AMBIEN) 5 MG tablet, Take 5 mg by mouth nightly as needed, Disp: , Rfl:     ALLERGIES:  Allergies   Allergen Reactions     Atorvastatin GI Disturbance     Sulfa Drugs Rash     Groin RAsh     Social History:  Patient lives with wife.  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  Negative tobacco use.  Negative alcohol use.      Family History:  Negative for bleeding or clotting disorders or adverse reactions to anesthesia.    Physical examination:  VITALS: Ht 1.778 m (5' 10\")   Wt 79.4 kg (175 lb)   BMI 25.11 kg/m    GENERAL: Healthy-appearing adult male in no acute distress.  Alert and oriented times three.  HEENT: Head normocephalic and atraumatic.  Extra-ocular movements intact.  Neck: Full range of motion without pain.  Respiratory: Breathing regular and non-labored.    Right upper extremity: Full shoulder, elbow, forearm, and wrist range of motion.  Tenderness over the lateral epicondyle and pain with resisted wrist extension. Noted triggering in the right middle finger and thumb. Carpal Compression Test: Positive, Phalen test positive. Negative Tinnel test. SILT in all nerve distributions. Hand warm and well perfused     Left upper extremity: Full shoulder, elbow, forearm, and wrist range of motion. No focal points of tenderness. Full strength in wrist flexors, extensors, FPL, EPL     Carpal Compression Test: Positive, Phalen test positive. Negative Tinnel test.     Skin: Intact without any rashes or abrasions.    ELECTRODIAGNOSTIC STUDIES:   There is electrodiagnostic evidence of 1) Bilateral carpal tunnel syndrome, of moderate severity, 2) A rather mild right ulnar neuropathy at the " elbow, 3) A non-localizing left ulnar sensory neuropathy, and 4) Bilateral active and chronic C8, and mild chronic inactive right C7 radiculopathies      Assessment: 59 year old, right handed male with multiple right arm issues.  1. Right lateral epicondylitis   - Physical therapy (patient will use is previous therapist for his back)   - Diclofenac (to be purchased over the counter)     2. Right middle and thumb trigger digits    - Corticosteroid injections provided     3. Right carpal tunnel    - Night splint     Patient will follow up with Dr. Gutierres in 6 weeks to discuss progress.     Staff: Bhavik Frazier MD   Orthopaedic Surgery, PGY-4    Procedure:   After written informed consent was obtained, the patient's right hand was prepped with chloraprep.  A combination 1 ml of betamethasone and 1 ml of lidocaine were injected into the right middle finger at the A1 pulley area and then the thumb at the A1 pulley area.  There were no immediate complications.    IJuancarlos, saw and evaluated this patient with the resident and agree with the resident s findings and plan of care as documented in the resident s note.  I was present and supervised both above injections in their entirety.

## 2020-07-02 ASSESSMENT — PATIENT HEALTH QUESTIONNAIRE - PHQ9: SUM OF ALL RESPONSES TO PHQ QUESTIONS 1-9: 25

## 2020-08-12 ENCOUNTER — VIRTUAL VISIT (OUTPATIENT)
Dept: ORTHOPEDICS | Facility: CLINIC | Age: 60
End: 2020-08-12
Payer: COMMERCIAL

## 2020-08-12 DIAGNOSIS — M25.511 RIGHT SHOULDER PAIN, UNSPECIFIED CHRONICITY: Primary | ICD-10-CM

## 2020-08-12 NOTE — NURSING NOTE
Reason For Visit:   Chief Complaint   Patient presents with     Follow Up     6 week follow up right lateral epicondlitis and right middle and thumb trigger injections     Primary MD: Jackie Guerrero  Ref. MD: Hubert Collado MD     Age: 60 year old    ?  No    There were no vitals taken for this visit.    Pain Assessment  Patient Currently in Pain: Denies  Primary Pain Location: Elbow    QuickDASH Assessment  No flowsheet data found.     Current Outpatient Medications   Medication Sig Dispense Refill     ALPRAZolam (XANAX) 0.25 MG tablet Take 0.25 mg by mouth 2 times daily       amphetamine-dextroamphetamine (ADDERALL) 10 MG tablet Take 2 tabs in the morning and 1 tab at noon       aspirin (ASA) 81 MG EC tablet Take 81 mg by mouth daily       caffeine (NO-DOZE) 200 MG TABS tablet Take 200 mg by mouth daily       fenofibrate (TRIGLIDE/LOFIBRA) 160 MG tablet Take 160 mg by mouth daily       finasteride (PROSCAR) 5 MG tablet Take 2.5 mg by mouth daily       lisinopril-hydrochlorothiazide (ZESTORETIC) 20-12.5 MG tablet Take 1 tablet by mouth daily       Multiple Vitamins-Minerals (MULTIVITAMIN ADULT PO) Take 1 tablet by mouth daily       Omega-3 Fatty Acids (OMEGA-3 FISH OIL) 1200 MG CAPS Take 1,200 mg by mouth daily       potassium chloride ER (KLOR-CON M) 10 MEQ CR tablet Take 10 mEq by mouth 2 times daily       propranolol ER (INDERAL LA) 60 MG 24 hr capsule Take 60 mg by mouth daily       sertraline (ZOLOFT) 100 MG tablet Take 200 mg by mouth daily       traZODone (DESYREL) 50 MG tablet Take 100 mg by mouth At Bedtime       zolpidem (AMBIEN) 5 MG tablet Take 5 mg by mouth nightly as needed       Allergies   Allergen Reactions     Atorvastatin GI Disturbance     Sulfa Drugs Rash     Groin RAsh     Ashli Rodriguez ATC

## 2020-08-12 NOTE — LETTER
"    8/12/2020         RE: Nicolas Mason  [address] [place] [id number]        Dear Colleague,    Thank you for referring your patient, Nicolas Mason, to the [facility]. Please see a copy of my visit note below.    Nicolas Mason is a 60 year old male who is being evaluated via a billable telephone visit.      The patient has been notified of following:     \"This telephone visit will be conducted via a call between you and your physician/provider. We have found that certain health care needs can be provided without the need for a physical exam.  This service lets us provide the care you need with a short phone conversation.  If a prescription is necessary we can send it directly to your pharmacy.  If lab work is needed we can place an order for that and you can then stop by our lab to have the test done at a later time.    Telephone visits are billed at different rates depending on your insurance coverage. During this emergency period, for some insurers they may be billed the same as an in-person visit.  Please reach out to your insurance provider with any questions.    If during the course of the call the physician/provider feels a telephone visit is not appropriate, you will not be charged for this service.\"    Patient has given verbal consent for Telephone visit?  Yes    What phone number would you like to be contacted at? [phone]    How would you like to obtain your AVS? [facility]    Phone call duration: 23 minutes    Juancarlos Gutierres MD        Telephone visit:     I spoke with the patient in follow-up today.  I saw him on July 1 of this year.  At that point he complained of right elbow pain, right middle finger and thumb triggering, and carpal tunnel symptoms.  He has had a recent EMG showing bilateral moderate carpal tunnel syndrome, mild right ulnar neuropathy, nonlocalizing left ulnar sensory neuropathy, and some radiculopathies.  He reports that the injections were " successful in resolving his triggering.  Furthermore, he does have potentially some numbness and tingling in the right hand but this is not particularly bothersome to him and he is not interested in further management of it.  He states that he is suggestible and feels that this may in fact be the reason for his current numbness and tingling which he really does not feel has been a major issue in the past.  He is not interested in pursuing treatment for this at this point.  His main issue is actually his right tennis elbow.  This is been going on for about 6 months and it is painful opening jars and with other activities.  His greatest interest in this point is what treatment options might be for his tennis elbow.  He did buy diclofenac gel as we recommended.  It was not helpful.  He has not started therapy yet for his elbow.  He was concerned regarding a prior documentation error at his last visit where the procedure note stated that the injections have been done on the left.  I apologize for this.  The injections were indeed done on the right and this was rectified today.      I reviewed treatment options for him for lateral epicondylitis including observation, anti-inflammatories, tennis elbow strap, therapy, activity modification, steroid injections, PRP, Tenex, and surgery.  I discussed that most cases resolve on their own although it can take quite some time.  I did offer to place a referral to discuss options for PRP or Tenex with a primary care sports medicine team.  However he is not interested in this at this time and does not want to go this route.  He is wondering who he should see about his shoulder where he states he has major tears.  I will place a referral for our shoulder surgery team for further evaluation of this.  All his questions were answered.  He will follow-up with me on an as-needed basis.     Total time on telephone 23 min     Again, thank you for allowing me to participate in the care of  your patient.        Sincerely,        Jauncarlos Gutierres MD

## 2020-08-12 NOTE — PROGRESS NOTES
Telephone visit:     I spoke with the patient in follow-up today.  I saw him on July 1 of this year.  At that point he complained of right elbow pain, right middle finger and thumb triggering, and carpal tunnel symptoms.  He has had a recent EMG showing bilateral moderate carpal tunnel syndrome, mild right ulnar neuropathy, nonlocalizing left ulnar sensory neuropathy, and some radiculopathies.  He reports that the injections were successful in resolving his triggering.  Furthermore, he does have potentially some numbness and tingling in the right hand but this is not particularly bothersome to him and he is not interested in further management of it.  He states that he is suggestible and feels that this may in fact be the reason for his current numbness and tingling which he really does not feel has been a major issue in the past.  He is not interested in pursuing treatment for this at this point.  His main issue is actually his right tennis elbow.  This is been going on for about 6 months and it is painful opening jars and with other activities.  His greatest interest in this point is what treatment options might be for his tennis elbow.  He did buy diclofenac gel as we recommended.  It was not helpful.  He has not started therapy yet for his elbow.  He was concerned regarding a prior documentation error at his last visit where the procedure note stated that the injections have been done on the left.  I apologize for this.  The injections were indeed done on the right and this was rectified today.      I reviewed treatment options for him for lateral epicondylitis including observation, anti-inflammatories, tennis elbow strap, therapy, activity modification, steroid injections, PRP, Tenex, and surgery.  I discussed that most cases resolve on their own although it can take quite some time.  I did offer to place a referral to discuss options for PRP or Tenex with a primary care sports medicine team.  However he is  not interested in this at this time and does not want to go this route.  He is wondering who he should see about his shoulder where he states he has major tears.  I will place a referral for our shoulder surgery team for further evaluation of this.  All his questions were answered.  He will follow-up with me on an as-needed basis.     Total time on telephone 23 min

## 2020-08-12 NOTE — PROGRESS NOTES
"Nicolas Mason is a 60 year old male who is being evaluated via a billable telephone visit.      The patient has been notified of following:     \"This telephone visit will be conducted via a call between you and your physician/provider. We have found that certain health care needs can be provided without the need for a physical exam.  This service lets us provide the care you need with a short phone conversation.  If a prescription is necessary we can send it directly to your pharmacy.  If lab work is needed we can place an order for that and you can then stop by our lab to have the test done at a later time.    Telephone visits are billed at different rates depending on your insurance coverage. During this emergency period, for some insurers they may be billed the same as an in-person visit.  Please reach out to your insurance provider with any questions.    If during the course of the call the physician/provider feels a telephone visit is not appropriate, you will not be charged for this service.\"    Patient has given verbal consent for Telephone visit?  Yes    What phone number would you like to be contacted at? [phone]    How [facility] you like to [facility] your [facility]    Phone call duration: 23 minutes    Juancarlos Gutierres MD      "

## 2020-08-24 ENCOUNTER — PRE VISIT (OUTPATIENT)
Dept: ORTHOPEDICS | Facility: CLINIC | Age: 60
End: 2020-08-24

## 2020-08-24 NOTE — TELEPHONE ENCOUNTER
[facility] Call Center    Phone Message    May a detailed message be left on voicemail: yes     Reason for Call: The patient returned a Pre-visit call.  He will wait for a call back.  Thank you.     Action Taken: Message routed to:  Adult Clinics: Orthopedics p [id number]    Travel Screening: Not Applicable

## 2020-08-24 NOTE — TELEPHONE ENCOUNTER
Right shoulder pain, MR 9/27/18 (need pushed), XR 10/2/18 (need pushed), ref'd by Dr. Gutierres.    Called Pt and left VM to callback to discuss records.  Called [facility] ([place], [place]) to get MR and XR pushed if possible.    Mansoor Durham RN

## 2020-08-24 NOTE — TELEPHONE ENCOUNTER
Discussed with Pt, realistically, he needs new imaging of both shoulders. We discussed the process of evaluating shoulders and discussed XR vs MRI and what they offer. Pt is agreeable to getting new imaging, but would prefer to be seen at the [facility]. Helped Pt schedule appt for 9/16/20 since he wanted late as possible in the day.    Mansoor Durham RN

## 2020-08-25 NOTE — TELEPHONE ENCOUNTER
RECORDS RECEIVED FROM: Bilateral shoulder pain (R>L), some imaging pulled in PACS, Pt likely needs new XR, nothing since 2018 (R) and 2014 (L)   DATE RECEIVED: Sep 16, 2020   NOTES STATUS DETAILS   OFFICE NOTE from referring provider Internal    OFFICE NOTE from other specialist N/A    DISCHARGE SUMMARY from hospital N/A    DISCHARGE REPORT from the ER N/A    OPERATIVE REPORT N/A    MEDICATION LIST Internal    IMPLANT RECORD/STICKER N/A    LABS     CBC/DIFF N/A    CULTURES N/A    INJECTIONS DONE IN RADIOLOGY N/A    MRI Received    CT SCAN N/A    XRAYS (IMAGES & REPORTS) N/A    TUMOR     PATHOLOGY  Slides & report N/A    08/25/20   11:19 AM   Images in PACS  Doris Lnaier CMA

## 2020-09-10 DIAGNOSIS — M25.511 BILATERAL SHOULDER PAIN, UNSPECIFIED CHRONICITY: Primary | ICD-10-CM

## 2020-09-10 DIAGNOSIS — M25.512 BILATERAL SHOULDER PAIN, UNSPECIFIED CHRONICITY: Primary | ICD-10-CM

## 2020-09-16 ENCOUNTER — ANCILLARY PROCEDURE (OUTPATIENT)
Dept: GENERAL RADIOLOGY | Facility: CLINIC | Age: 60
End: 2020-09-16
Attending: ORTHOPAEDIC SURGERY
Payer: COMMERCIAL

## 2020-09-16 ENCOUNTER — PRE VISIT (OUTPATIENT)
Dept: ORTHOPEDICS | Facility: CLINIC | Age: 60
End: 2020-09-16

## 2020-09-16 ENCOUNTER — OFFICE VISIT (OUTPATIENT)
Dept: ORTHOPEDICS | Facility: CLINIC | Age: 60
End: 2020-09-16
Payer: COMMERCIAL

## 2020-09-16 VITALS — BODY MASS INDEX: 27.03 KG/M2 | HEIGHT: 70 IN | WEIGHT: 188.8 LBS

## 2020-09-16 DIAGNOSIS — M25.511 CHRONIC PAIN OF BOTH SHOULDERS: Primary | ICD-10-CM

## 2020-09-16 DIAGNOSIS — M25.512 CHRONIC PAIN OF BOTH SHOULDERS: Primary | ICD-10-CM

## 2020-09-16 DIAGNOSIS — M25.511 BILATERAL SHOULDER PAIN, UNSPECIFIED CHRONICITY: ICD-10-CM

## 2020-09-16 DIAGNOSIS — M25.511 RIGHT SHOULDER PAIN, UNSPECIFIED CHRONICITY: ICD-10-CM

## 2020-09-16 DIAGNOSIS — G89.29 CHRONIC PAIN OF BOTH SHOULDERS: Primary | ICD-10-CM

## 2020-09-16 DIAGNOSIS — M25.512 BILATERAL SHOULDER PAIN, UNSPECIFIED CHRONICITY: ICD-10-CM

## 2020-09-16 RX ORDER — ATORVASTATIN CALCIUM 20 MG/1
TABLET, FILM COATED ORAL
COMMUNITY
Start: 2020-09-14

## 2020-09-16 ASSESSMENT — MIFFLIN-ST. JEOR: SCORE: 1662.64

## 2020-09-16 NOTE — NURSING NOTE
"Reason For Visit:   Chief Complaint   Patient presents with     Consult     Bilateral shoulder pain Ref. Dr. Gutierres       PCP: Jackie Guerrero  Ref: Dr. Gutierres    ?  No  Occupation [employer]   Currently working? Yes.  Work status?  Part-time.  Date of injury: gymnastics in high school and college - chronic    Date of surgery: NA  Type of surgery: NA.  Smoker: No  Request smoking cessation information: No    Right hand dominant    SANE score  Affected shoulder: Bilateral  Right shoulder SANE: 50  Left shoulder SANE: 50    Ht 1.778 m (5' 10\")   Wt 85.6 kg (188 lb 12.8 oz)   BMI 27.09 kg/m        Pain Assessment  Patient Currently in Pain: Denies(pain with movment or usages)    Corinne Cm ATC        "

## 2020-09-16 NOTE — PROGRESS NOTES
CHIEF CONCERN:  Bilateral shoulder pain     HISTORY OF PRESENT ILLNESS:  Nicolas Mason is a 60 year old male who presents for evaluation of bilateral shoulder pain. He reports the pain has been longstanding, but worsening over the last three years. 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 He felt a pop in his right shoulder. He was seen at the time and they completed an  MRI and was found to have right supraspinatus tear. He chose not to move forward with surgery at that time. He has not tried other nonoperative modalities such as intraarticular injections or PT.     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 This pain however has improved and he feels his shoulders are his biggest concern.     PAST MEDICAL HISTORY: reviewed in EMR    Current Outpatient Medications   Medication Sig Dispense Refill     ALPRAZolam (XANAX) 0.25 MG tablet Take 0.25 mg by mouth 2 times daily       amphetamine-dextroamphetamine (ADDERALL) 10 MG tablet Take 2 tabs in the morning and 1 tab at noon       aspirin (ASA) 81 MG EC tablet Take 81 mg by mouth daily       atorvastatin (LIPITOR) 20 MG tablet        caffeine (NO-DOZE) 200 MG TABS tablet Take 200 mg by mouth daily       finasteride (PROSCAR) 5 MG tablet Take 2.5 mg by mouth daily       lisinopril-hydrochlorothiazide (ZESTORETIC) 20-12.5 MG tablet Take 1 tablet by mouth daily       Multiple Vitamins-Minerals (MULTIVITAMIN ADULT PO) Take 1 tablet by mouth daily       Omega-3 Fatty Acids (OMEGA-3 FISH OIL) 1200 MG CAPS Take 1,200 mg by mouth daily       potassium chloride ER (KLOR-CON M) 10 MEQ CR tablet Take 10 mEq by mouth 2 times daily       propranolol ER (INDERAL LA) 60 MG 24 hr capsule Take 60 mg by mouth daily       sertraline (ZOLOFT) 100 MG tablet Take 200 mg by mouth daily       traZODone (DESYREL) 50 MG tablet Take 100 mg by mouth At Bedtime        fenofibrate (TRIGLIDE/LOFIBRA) 160 MG tablet Take 160 mg by mouth daily            Allergies   Allergen Reactions     Atorvastatin GI Disturbance     Sulfa Drugs Rash     Groin RAsh       SOCIAL HISTORY:    Social History     Socioeconomic History     Marital status:      Spouse name: Not on file     Number of children: Not on file     Years of education: Not on file     Highest education level: Not on file   Occupational History     Not on file   Social Needs     Financial resource strain: Not on file     Food insecurity     Worry: Not on file     Inability: Not on file     Transportation needs     Medical: Not on file     Non-medical: Not on file   Tobacco Use     Smoking status: Never Smoker     Smokeless tobacco: Never Used   Substance and Sexual Activity     Alcohol use: Not on file     Drug use: Not on file     Sexual activity: Not on file   Lifestyle     Physical activity     Days per week: Not on file     Minutes per session: Not on file     Stress: Not on file   Relationships     Social connections     Talks on phone: Not on file     Gets together: Not on file     Attends Druze service: Not on file     Active member of club or organization: Not on file     Attends meetings of clubs or organizations: Not on file     Relationship status: Not on file     Intimate partner violence     Fear of current or ex partner: Not on file     Emotionally abused: Not on file     Physically abused: Not on file     Forced sexual activity: Not on file   Other Topics Concern     Not on file   Social History Narrative     Not on file       FAMILY HISTORY: Reviewed in EMR      REVIEW OF SYSTEMS: Positive for that noted in past medical history and history of present illness and otherwise reviewed in EMR     PHYSICAL EXAM:    Adult male in no acute distress. Articulates and communicates with normal affect.  Respirations even and unlabored  Focused right upper extremity exam:  degrees, abduction 90 degrees,  ER to 55 degrees, IR to L2, nontender to palpation over bicipital groove, nontender to palpation over AC joint, positive [place]'s, negative Speed's test, negative empty can.     Focused exam of the LUE:  degrees, abduction of 90 degrees, ER to 55 degrees, IF to L2, nontender over bicipital groove, nontender to palpation over AC joint, negative O'Briens, positive empty can test.     IMAGING:  Reviewed MRIs from 2017, notable for full thickness tear of the supraspinatus with intact biceps though with some tendinopathy.     Right shoulder XR from today with superior humeral head osteophyte formation, moderate translation of humeral head within the joint, though still with well preserved GH joint space. No acute fracture or dislocation.     Left shoulder XR from today with humeral head with minimal osteophyte formation of superiolateral humeral head, no acute fracture, no dislocation     ASSESSMENT:    1. Right Rotator Cuff Tear, supraspinatus  2. Left Shoulder pain, likely rotator cuff tendinopathy    PLAN:  1. Bilateral shoulder MRIs   2. Follow up after MRI completion     Patient seen and discussed with Dr. Millan.     Kusum Morillo MD   Orthopaedic Surgery, PGY-1     I have personally examined this patient and have reviewed the clinical presentation and progress note with the resident.  I agree with the treatment plan as outlined.  The plan was formulated with the resident on the day of the resident's note.

## 2020-09-16 NOTE — LETTER
9/16/2020         RE: Nicolas Mason  [address] [place] [id number]        Dear Colleague,    Thank you for referring your patient, Nicolas Mason, to the [facility]. Please see a copy of my visit note below.    CHIEF CONCERN:  Bilateral shoulder pain     HISTORY OF PRESENT ILLNESS:  Nicolas Mason is a 60 year old male who presents for evaluation of bilateral shoulder pain. He reports the pain has been longstanding, but worsening over the last three years. 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 He felt a pop in his right shoulder. He was seen at the time and they completed an  MRI and was found to have right supraspinatus tear. He chose not to move forward with surgery at that time. He has not tried other nonoperative modalities such as intraarticular injections or PT.     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 This pain however has improved and he feels his shoulders are his biggest concern.     PAST MEDICAL HISTORY: reviewed in EMR    Current Outpatient Medications   Medication Sig Dispense Refill     ALPRAZolam (XANAX) 0.25 MG tablet Take 0.25 mg by mouth 2 times daily       amphetamine-dextroamphetamine (ADDERALL) 10 MG tablet Take 2 tabs in the morning and 1 tab at noon       aspirin (ASA) 81 MG EC tablet Take 81 mg by mouth daily       atorvastatin (LIPITOR) 20 MG tablet        caffeine (NO-DOZE) 200 MG TABS tablet Take 200 mg by mouth daily       finasteride (PROSCAR) 5 MG tablet Take 2.5 mg by mouth daily       lisinopril-hydrochlorothiazide (ZESTORETIC) 20-12.5 MG tablet Take 1 tablet by mouth daily       Multiple Vitamins-Minerals (MULTIVITAMIN ADULT PO) Take 1 tablet by mouth daily       Omega-3 Fatty Acids (OMEGA-3 FISH OIL) 1200 MG CAPS Take 1,200 mg by mouth daily       potassium chloride ER (KLOR-CON M) 10 MEQ CR tablet Take  10 mEq by mouth 2 times daily       propranolol ER (INDERAL LA) 60 MG 24 hr capsule Take 60 mg by mouth daily       sertraline (ZOLOFT) 100 MG tablet Take 200 mg by mouth daily       traZODone (DESYREL) 50 MG tablet Take 100 mg by mouth At Bedtime       fenofibrate (TRIGLIDE/LOFIBRA) 160 MG tablet Take 160 mg by mouth daily            Allergies   Allergen Reactions     Atorvastatin GI Disturbance     Sulfa Drugs Rash     Groin RAsh       SOCIAL HISTORY:    Social History     Socioeconomic History     Marital status:      Spouse name: Not on file     Number of children: Not on file     Years of education: Not on file     Highest education level: Not on file   Occupational History     Not on file   Social Needs     Financial resource strain: Not on file     Food insecurity     Worry: Not on file     Inability: Not on file     Transportation needs     Medical: Not on file     Non-medical: Not on file   Tobacco Use     Smoking status: Never Smoker     Smokeless tobacco: Never Used   Substance and Sexual Activity     Alcohol use: Not on file     Drug use: Not on file     Sexual activity: Not on file   Lifestyle     Physical activity     Days per week: Not on file     Minutes per session: Not on file     Stress: Not on file   Relationships     Social connections     Talks on phone: Not on file     Gets together: Not on file     Attends [employer]ism service: Not on file     Active member of club or organization: Not on file     Attends meetings of clubs or organizations: Not on file     Relationship status: Not on file     Intimate partner violence     Fear of current or ex partner: Not on file     Emotionally abused: Not on file     Physically abused: Not on file     Forced sexual activity: Not on file   Other Topics Concern     Not on file   Social History Narrative     Not on file       FAMILY HISTORY: Reviewed in EMR      REVIEW OF SYSTEMS: Positive for that noted in past medical history and history of present  illness and otherwise reviewed in EMR     PHYSICAL EXAM:    Adult male in no acute distress. Articulates and communicates with normal affect.  Respirations even and unlabored  Focused right upper extremity exam:  degrees, abduction 90 degrees, ER to 55 degrees, IR to L2, nontender to palpation over bicipital groove, nontender to palpation over AC joint, positive [place]'s, negative Speed's test, negative empty can.     Focused exam of the LUE:  degrees, abduction of 90 degrees, ER to 55 degrees, IF to L2, nontender over bicipital groove, nontender to palpation over AC joint, negative O'Briens, positive empty can test.     IMAGING:  Reviewed MRIs from 2017, notable for full thickness tear of the supraspinatus with intact biceps though with some tendinopathy.     Right shoulder XR from today with superior humeral head osteophyte formation, moderate translation of humeral head within the joint, though still with well preserved GH joint space. No acute fracture or dislocation.     Left shoulder XR from today with humeral head with minimal osteophyte formation of superiolateral humeral head, no acute fracture, no dislocation     ASSESSMENT:    1. Right Rotator Cuff Tear, supraspinatus  2. Left Shoulder pain, likely rotator cuff tendinopathy    PLAN:  1. Bilateral shoulder MRIs   2. Follow up after MRI completion     Patient seen and discussed with Dr. Millan.     Kusum Morillo MD   Orthopaedic Surgery, PGY-1     I have personally examined this patient and have reviewed the clinical presentation and progress note with the resident.  I agree with the treatment plan as outlined.  The plan was formulated with the resident on the day of the resident's note.

## 2020-09-16 NOTE — PROGRESS NOTES
CHIEF CONCERN:  Bilateral shoulder pain      HISTORY OF PRESENT ILLNESS: Nicolas Mason is a 60 year old male who presents for evaluation of bilateral shoulder pain. He reports the pain has been longstanding, but worsening over the last three years. 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 He felt a pop in his right shoulder. He was seen at the time and they completed an MRI and was found to have right supraspinatus tear. He did not have an operative intervention. His goals of care for this visit are to be able to rock climb, golf, and participate in other physically demanding activities with his kids again. He notes that he does get pain relief with rest and is able to complete ADL's without difficulty. He still goes to the gym and is able to do push-ups and bench press but is somewhat limited. He has never had surgery or an injection in either shoulder.     No past medical history on file.    No past surgical history on file.    Current Outpatient Medications   Medication Sig Dispense Refill     ALPRAZolam (XANAX) 0.25 MG tablet Take 0.25 mg by mouth 2 times daily       amphetamine-dextroamphetamine (ADDERALL) 10 MG tablet Take 2 tabs in the morning and 1 tab at noon       aspirin (ASA) 81 MG EC tablet Take 81 mg by mouth daily       atorvastatin (LIPITOR) 20 MG tablet        caffeine (NO-DOZE) 200 MG TABS tablet Take 200 mg by mouth daily       finasteride (PROSCAR) 5 MG tablet Take 2.5 mg by mouth daily       lisinopril-hydrochlorothiazide (ZESTORETIC) 20-12.5 MG tablet Take 1 tablet by mouth daily       Multiple Vitamins-Minerals (MULTIVITAMIN ADULT PO) Take 1 tablet by mouth daily       Omega-3 Fatty Acids (OMEGA-3 FISH OIL) 1200 MG CAPS Take 1,200 mg by mouth daily       potassium chloride ER (KLOR-CON M) 10 MEQ CR tablet Take 10 mEq by mouth 2 times daily       propranolol ER (INDERAL LA) 60 MG 24 hr capsule Take 60 mg by  mouth daily       sertraline (ZOLOFT) 100 MG tablet Take 200 mg by mouth daily       traZODone (DESYREL) 50 MG tablet Take 100 mg by mouth At Bedtime       fenofibrate (TRIGLIDE/LOFIBRA) 160 MG tablet Take 160 mg by mouth daily            Allergies   Allergen Reactions     Atorvastatin GI Disturbance     Sulfa Drugs Rash     Groin RAsh       SOCIAL HISTORY:    Social History     Socioeconomic History     Marital status:      Spouse name: Not on file     Number of children: Not on file     Years of education: Not on file     Highest education level: Not on file   Occupational History     Not on file   Social Needs     Financial resource strain: Not on file     Food insecurity     Worry: Not on file     Inability: Not on file     Transportation needs     Medical: Not on file     Non-medical: Not on file   Tobacco Use     Smoking status: Never Smoker     Smokeless tobacco: Never Used   Substance and Sexual Activity     Alcohol use: Not on file     Drug use: Not on file     Sexual activity: Not on file   Lifestyle     Physical activity     Days per week: Not on file     Minutes per session: Not on file     Stress: Not on file   Relationships     Social connections     Talks on phone: Not on file     Gets together: Not on file     Attends Pentecostalism service: Not on file     Active member of club or organization: Not on file     Attends meetings of clubs or organizations: Not on file     Relationship status: Not on file     Intimate partner violence     Fear of current or ex partner: Not on file     Emotionally abused: Not on file     Physically abused: Not on file     Forced sexual activity: Not on file   Other Topics Concern     Not on file   Social History Narrative     Not on file       FAMILY HISTORY: Reviewed in EMR      REVIEW OF SYSTEMS: Positive for that noted in past medical history and history of present illness and otherwise reviewed in EMR     PHYSICAL EXAM:    Adult in no acute distress. Articulates  and communicates with normal affect.  Respirations even and unlabored  Focused right upper extremity exam:  degrees, abduction 90 degrees, ER to 55 degrees, IR to L2, nontender to palpation over bicipital groove, nontender to palpation over AC joint, positive [place]'s, negative Speed's test, negative empty can.      Focused exam of the LUE:  degrees, abduction 90 degrees, ER to 55 degrees, IR to L2, nontender over bicipital groove, nontender to palpation over AC joint, negative O'Briens, positive empty can test.      IMAGING:  MRI 2017: personally reviewed and demonstrate a full thickness tear of the R supraspinatus tendon.   XR 9/16/20: personally reviewed today and demonstrate appropriate glenohumeral and acromioclavicular joint space bilaterally. Minor osteophytes noted at superior aspect of bilateral shoulders with some superior shoulder migration (L>R).    ASSESSMENT:    Akbar Mason is a 59 yo M without significant PMH who presents with chronic bilateral shoulder pain and MRI evidence from 2018 of a R supraspinatus tear.    PLAN:  1. Bilateral MRI to visual rotator cuff pathology  2. Follow up after MRI  3. Patient needs time to think about treatment options. Instructed him to contact us if he has any questions or concerns.     Patient seen and discussed with Dr. Millan.    Akbar Benitez, MS4  [employer]

## 2020-09-26 ENCOUNTER — ANCILLARY PROCEDURE (OUTPATIENT)
Dept: MRI IMAGING | Facility: CLINIC | Age: 60
End: 2020-09-26
Attending: ORTHOPAEDIC SURGERY
Payer: COMMERCIAL

## 2020-09-26 DIAGNOSIS — M25.511 CHRONIC PAIN OF BOTH SHOULDERS: ICD-10-CM

## 2020-09-26 DIAGNOSIS — M25.512 CHRONIC PAIN OF BOTH SHOULDERS: ICD-10-CM

## 2020-09-26 DIAGNOSIS — G89.29 CHRONIC PAIN OF BOTH SHOULDERS: ICD-10-CM

## 2020-10-07 ENCOUNTER — VIRTUAL VISIT (OUTPATIENT)
Dept: ORTHOPEDICS | Facility: CLINIC | Age: 60
End: 2020-10-07
Payer: COMMERCIAL

## 2020-10-07 DIAGNOSIS — M75.122 COMPLETE TEAR OF LEFT ROTATOR CUFF, UNSPECIFIED WHETHER TRAUMATIC: Primary | ICD-10-CM

## 2020-10-07 PROCEDURE — 99214 OFFICE O/P EST MOD 30 MIN: CPT | Mod: 95 | Performed by: ORTHOPAEDIC SURGERY

## 2020-10-07 NOTE — LETTER
"    10/7/2020         RE: Nicolas Mason  [address] [place] [phone]        Dear Colleague,    Thank you for referring your patient, Nicolas Mason, to the [facility] [place]. Please see a copy of my visit note below.    Nicolas Mason is a 60 year old male who is being evaluated via a billable video visit.      The patient has been notified of following:     \"This video visit will be conducted via a call between you and your physician/provider. We have found that certain health care needs can be provided without the need for an in-person physical exam.  This service lets us provide the care you need with a video conversation.  If a prescription is necessary we can send it directly to your pharmacy.  If lab work is needed we can place an order for that and you can then stop by our lab to have the test done at a later time.    Video visits are billed at different rates depending on your insurance coverage.  Please reach out to your insurance provider with any questions.    If during the course of the call the physician/provider feels a video visit is not appropriate, you will not be charged for this service.\"    Patient has given verbal consent for Video visit? Yes  How would you like to obtain your AVS? [employer]    Video Visit Technology for this patient: [employer] Video Visit- Patient was left in waiting room      Will anyone else be joining your video visit? No      CHIEF CONCERN:  Bilateral shoulder pain, Left>R; follow up MRI    HISTORY OF PRESENT ILLNESS:  Mr. Mason is a 60 year old man who I am visiting with virtually to review his MRI results.     IMAGING:  Right shoulder MRI dated was reviewed and I agree with the Impression below:  Impression:  1. Full-thickness tear of the supraspinatus involving the middle  fibers at the footprint with approximately 1.8 cm of medial retraction  of torn fibers with residual strand of anteriormost fibers and  posterior fiber with " tear extension posteriorly as moderate grade  articular sided tear involving the supraspinatus/infraspinatus  junctional fibers. No muscle bulk loss.  2. High-grade articular sided tear of the subscapularis upper fibers  at the footprint possibly communicating with intertubercular groove.  3. On a background of severe intra-articular segment tendinosis of the  long head of biceps tendon, partial tearing at the bicipital pulley  segment.    Left shoulder MRI dated was reviewed and I agree with the Impression below:  Impression:  1. Complete supraspinatus tendon tear at its footprint with differential retraction of torn fibers medially, up to 3.8 cm. No associated muscle bulk loss.  2. Moderate grade intrasubstance tear of the infraspinatus inferior  fibers at the footprint.  3. Suspected posterior superior and superior labral tearing.    ASSESSMENT:  1. Bilateral rotator cuff tears, complete  2. Bilateral superior labral tearing    PLAN:  We discussed the natural history of rotator cuff tears including that they do not heal on their own. We discussed the risks and benefits of both non-operative and operative treatment options with risks of surgery including but not limited to bleeding, infection, failure of the cuff to heal, anesthesia risks, injury to nerves or vessels which power the arm or hand. We discussed the postoperative restrictions and rehab course. Given the patient's current significant functional limitations and pain, he would prefer to proceed with operative treatment.  We also discussed management of the long head of the biceps with tenodesis. He would like to proceed with surgical scheduling and we will work with him in this regard. We discussed whether he would like to do the left or right first and based on tear appearance will proceed with left shoulder at this time.  I completed the informed consent process with the patient including a discussion of the risks, benefits, alternatives for the above  named procedure. I discussed the post operative rehabilitation process/expectations, activity/work restrictions, and anticipated recovery time. We discussed the surgical risks including but not limited to risk of infection, bleeding, permanent injury to nerves and vessels. We discussed the risks of post operative weakness, stiffness, pain which is no better or worse, and/or need for additional procedures. The patient had the opportunity to ask questions and these were answered in lay language.  Patient prefers to address the left side first.  Surgical plan will be for left arthroscopic cuff repair, subacromial decompression, open biceps tenodesis.    Sariah Millan MD        Video-Visit Details    Type of service:  Video Visit    Video Start Time: 1::36pm  Video End Time: 2:06 PM    Originating Location (pt. Location): Home    Distant Location (provider location):  [facility]     Platform used for Video Visit: [employer]    Sariah Millan MD

## 2020-10-07 NOTE — PROGRESS NOTES
"Nicolas Mason is a 60 year old male who is being evaluated via a billable video visit.      The patient has been notified of following:     \"This video visit will be conducted via a call between you and your physician/provider. We have found that certain health care needs can be provided without the need for an in-person physical exam.  This service lets us provide the care you need with a video conversation.  If a prescription is necessary we can send it directly to your pharmacy.  If lab work is needed we can place an order for that and you can then stop by our lab to have the test done at a later time.    Video visits are billed at different rates depending on your insurance coverage.  Please reach out to your insurance provider with any questions.    If during the course of the call the physician/provider feels a video visit is not appropriate, you will not be charged for this service.\"    Patient has given verbal consent for Video visit? Yes  How would you like to obtain your AVS? [facility] ([facility])    Video Visit Technology for this patient: Max-Viz Video Visit- Patient was left in waiting room      Will anyone else be joining your video visit? No      CHIEF CONCERN:  Bilateral shoulder pain, Left>R; follow up MRI    HISTORY OF PRESENT ILLNESS:  Mr. Mason is a 60 year old man who I am visiting with virtually to review his MRI results.     IMAGING:  Right shoulder MRI dated was reviewed and I agree with the Impression below:  Impression:  1. Full-thickness tear of the supraspinatus involving the middle  fibers at the footprint with approximately 1.8 cm of medial retraction  of torn fibers with residual strand of anteriormost fibers and  posterior fiber with tear extension posteriorly as moderate grade  articular sided tear involving the supraspinatus/infraspinatus  junctional fibers. No muscle bulk loss.  2. High-grade articular sided tear of the subscapularis upper fibers  at the footprint possibly communicating with " intertubercular groove.  3. On a background of severe intra-articular segment tendinosis of the  long head of biceps tendon, partial tearing at the bicipital pulley  segment.    Left shoulder MRI dated was reviewed and I agree with the Impression below:  Impression:  1. Complete supraspinatus tendon tear at its footprint with differential retraction of torn fibers medially, up to 3.8 cm. No associated muscle bulk loss.  2. Moderate grade intrasubstance tear of the infraspinatus inferior  fibers at the footprint.  3. Suspected posterior superior and superior labral tearing.    ASSESSMENT:  1. Bilateral rotator cuff tears, complete  2. Bilateral superior labral tearing    PLAN:  We discussed the natural history of rotator cuff tears including that they do not heal on their own. We discussed the risks and benefits of both non-operative and operative treatment options with risks of surgery including but not limited to bleeding, infection, failure of the cuff to heal, anesthesia risks, injury to nerves or vessels which power the arm or hand. We discussed the postoperative restrictions and rehab course. Given the patient's current significant functional limitations and pain, he would prefer to proceed with operative treatment.  We also discussed management of the long head of the biceps with tenodesis. He would like to proceed with surgical scheduling and we will work with him in this regard. We discussed whether he would like to do the left or right first and based on tear appearance will proceed with left shoulder at this time.  I completed the informed consent process with the patient including a discussion of the risks, benefits, alternatives for the above named procedure. I discussed the post operative rehabilitation process/expectations, activity/work restrictions, and anticipated recovery time. We discussed the surgical risks including but not limited to risk of infection, bleeding, permanent injury to nerves and  vessels. We discussed the risks of post operative weakness, stiffness, pain which is no better or worse, and/or need for additional procedures. The patient had the opportunity to ask questions and these were answered in lay language.  Patient prefers to address the left side first.  Surgical plan will be for left arthroscopic cuff repair, subacromial decompression, open biceps tenodesis.    Sariah Millan MD        Video-Visit Details    Type of service:  Video Visit    Video Start Time: 1::36pm  Video End Time: 2:06 PM    Originating Location (pt. Location): Home    Distant Location (provider location):  [facility]     Platform used for Video Visit: [facility]    Sariah Millan MD

## 2020-10-07 NOTE — NURSING NOTE
Reason For Visit:   Chief Complaint   Patient presents with     RECHECK     Bilateral shoulder pain (R>L) f/u MRI's        PCP: Jackie Guerrero  Ref: Dr. Gutierres     ?  No  Occupation [employer]   Currently working? Yes.  Work status?  Part-time.  Date of injury: gymnastics in high school and college - chronic     Date of surgery: NA  Type of surgery: NA.  Smoker: No  Request smoking cessation information: No     Right hand dominant    SANE score  Affected shoulder: Bilateral  Right shoulder SANE: 70  Left shoulder SANE: 70    There were no vitals taken for this visit.      Pain Assessment  Patient Currently in Pain: Sonya mC, ATC

## 2020-10-08 ENCOUNTER — TELEPHONE (OUTPATIENT)
Dept: ORTHOPEDICS | Facility: CLINIC | Age: 60
End: 2020-10-08

## 2020-10-08 PROBLEM — M75.122 COMPLETE TEAR OF LEFT ROTATOR CUFF, UNSPECIFIED WHETHER TRAUMATIC: Status: ACTIVE | Noted: 2020-10-08

## 2020-10-08 NOTE — TELEPHONE ENCOUNTER
Patient is scheduled for surgery with Dr. Millan      Spoke or left message with: Florencio with [facility]    Date of Surgery: 11/10/20    Location: [facility]    Informed patient they will need an adult  YEs    Pre-op with surgeon (if applicable): Complete    H&P: Patient to schedule with Jackie Guerrero    Additional imaging/appointments: Patient will await call from [facility] scheduling team    Surgery packet: Given in clinic     Additional comments: patient will await call pre op nurses 1-2 days prior to surgery for arrival time

## 2020-10-09 DIAGNOSIS — Z11.59 ENCOUNTER FOR SCREENING FOR OTHER VIRAL DISEASES: Primary | ICD-10-CM

## 2020-10-12 ENCOUNTER — TELEPHONE (OUTPATIENT)
Dept: ORTHOPEDICS | Facility: CLINIC | Age: 60
End: 2020-10-12

## 2020-10-12 NOTE — TELEPHONE ENCOUNTER
Shoulder surgery is scheduled 11/10/20 at the [facility].  Pre-op packet was mailed.  Unable to leave a message on patient's voicemail to call the clinic to review pre and post-op expectations.

## 2020-10-13 NOTE — TELEPHONE ENCOUNTER
Received voicemail from patient that he was returning a phone call. Patient states that call was dropped twice yesterday but he thinks his phone is working now.

## 2020-10-14 NOTE — TELEPHONE ENCOUNTER
Discussed pre and post-op expectations with the patient.  He will schedule a pre-op physical with his primary provider.  He was informed a COVID team member will call one week before surgery to schedule the test 3-4 days before surgery at a location convenient for him.  Patient states his son will provide transportation on the day of surgery.  His roommate will assist with cares after the procedure.  Post-op PT can be done close to home.  Sling time is 6 weeks.  He will review the pre-op packet and call if he has more questions.

## 2020-11-04 SDOH — HEALTH STABILITY: MENTAL HEALTH: HOW OFTEN DO YOU HAVE A DRINK CONTAINING ALCOHOL?: NEVER

## 2020-11-09 ENCOUNTER — ANESTHESIA EVENT (OUTPATIENT)
Dept: SURGERY | Facility: AMBULATORY SURGERY CENTER | Age: 60
End: 2020-11-09

## 2020-11-10 ENCOUNTER — ANESTHESIA (OUTPATIENT)
Dept: SURGERY | Facility: AMBULATORY SURGERY CENTER | Age: 60
End: 2020-11-10

## 2020-11-10 ENCOUNTER — HOSPITAL ENCOUNTER (OUTPATIENT)
Facility: AMBULATORY SURGERY CENTER | Age: 60
Discharge: HOME | End: 2020-11-10
Attending: ORTHOPAEDIC SURGERY | Admitting: ORTHOPAEDIC SURGERY
Payer: COMMERCIAL

## 2020-11-10 VITALS
HEIGHT: 70 IN | WEIGHT: 185 LBS | HEART RATE: 68 BPM | DIASTOLIC BLOOD PRESSURE: 66 MMHG | TEMPERATURE: 97.4 F | SYSTOLIC BLOOD PRESSURE: 103 MMHG | BODY MASS INDEX: 26.48 KG/M2 | OXYGEN SATURATION: 94 % | RESPIRATION RATE: 16 BRPM

## 2020-11-10 DIAGNOSIS — M75.122 COMPLETE TEAR OF LEFT ROTATOR CUFF, UNSPECIFIED WHETHER TRAUMATIC: Primary | ICD-10-CM

## 2020-11-10 PROCEDURE — 29823 SHO ARTHRS SRG XTNSV DBRDMT: CPT

## 2020-11-10 PROCEDURE — 29827 SHO ARTHRS SRG RT8TR CUF RPR: CPT | Mod: LT | Performed by: ORTHOPAEDIC SURGERY

## 2020-11-10 PROCEDURE — 23430 REPAIR BICEPS TENDON: CPT | Mod: AS | Performed by: PHYSICIAN ASSISTANT

## 2020-11-10 PROCEDURE — 23430 REPAIR BICEPS TENDON: CPT

## 2020-11-10 PROCEDURE — 29823 SHO ARTHRS SRG XTNSV DBRDMT: CPT | Mod: 59 | Performed by: ORTHOPAEDIC SURGERY

## 2020-11-10 PROCEDURE — 29827 SHO ARTHRS SRG RT8TR CUF RPR: CPT

## 2020-11-10 PROCEDURE — 29823 SHO ARTHRS SRG XTNSV DBRDMT: CPT | Mod: AS | Performed by: PHYSICIAN ASSISTANT

## 2020-11-10 PROCEDURE — 29827 SHO ARTHRS SRG RT8TR CUF RPR: CPT | Mod: AS | Performed by: PHYSICIAN ASSISTANT

## 2020-11-10 PROCEDURE — 23430 REPAIR BICEPS TENDON: CPT | Mod: LT | Performed by: ORTHOPAEDIC SURGERY

## 2020-11-10 DEVICE — IMP ANCHOR ARTHREX BIO-SWIVELOCK 5.5MM AR-2323BCC: Type: IMPLANTABLE DEVICE | Site: SHOULDER | Status: FUNCTIONAL

## 2020-11-10 RX ORDER — FLUMAZENIL 0.1 MG/ML
0.2 INJECTION, SOLUTION INTRAVENOUS
Status: DISCONTINUED | OUTPATIENT
Start: 2020-11-10 | End: 2020-11-10 | Stop reason: HOSPADM

## 2020-11-10 RX ORDER — FENTANYL CITRATE 50 UG/ML
25-50 INJECTION, SOLUTION INTRAMUSCULAR; INTRAVENOUS
Status: DISCONTINUED | OUTPATIENT
Start: 2020-11-10 | End: 2020-11-10 | Stop reason: HOSPADM

## 2020-11-10 RX ORDER — ACETAMINOPHEN 325 MG/1
650 TABLET ORAL
Status: DISCONTINUED | OUTPATIENT
Start: 2020-11-10 | End: 2020-11-11 | Stop reason: HOSPADM

## 2020-11-10 RX ORDER — EPHEDRINE SULFATE 50 MG/ML
INJECTION, SOLUTION INTRAMUSCULAR; INTRAVENOUS; SUBCUTANEOUS PRN
Status: DISCONTINUED | OUTPATIENT
Start: 2020-11-10 | End: 2020-11-10

## 2020-11-10 RX ORDER — ONDANSETRON 4 MG/1
4 TABLET, ORALLY DISINTEGRATING ORAL EVERY 30 MIN PRN
Status: DISCONTINUED | OUTPATIENT
Start: 2020-11-10 | End: 2020-11-11 | Stop reason: HOSPADM

## 2020-11-10 RX ORDER — CLONAZEPAM 0.5 MG/1
0.5 TABLET ORAL
COMMUNITY

## 2020-11-10 RX ORDER — SODIUM CHLORIDE, SODIUM LACTATE, POTASSIUM CHLORIDE, CALCIUM CHLORIDE 600; 310; 30; 20 MG/100ML; MG/100ML; MG/100ML; MG/100ML
INJECTION, SOLUTION INTRAVENOUS CONTINUOUS
Status: DISCONTINUED | OUTPATIENT
Start: 2020-11-10 | End: 2020-11-10 | Stop reason: HOSPADM

## 2020-11-10 RX ORDER — CEFAZOLIN SODIUM 1 G/50ML
1 SOLUTION INTRAVENOUS SEE ADMIN INSTRUCTIONS
Status: DISCONTINUED | OUTPATIENT
Start: 2020-11-10 | End: 2020-11-10 | Stop reason: HOSPADM

## 2020-11-10 RX ORDER — KETOROLAC TROMETHAMINE 30 MG/ML
INJECTION, SOLUTION INTRAMUSCULAR; INTRAVENOUS PRN
Status: DISCONTINUED | OUTPATIENT
Start: 2020-11-10 | End: 2020-11-10

## 2020-11-10 RX ORDER — NALOXONE HYDROCHLORIDE 0.4 MG/ML
.1-.4 INJECTION, SOLUTION INTRAMUSCULAR; INTRAVENOUS; SUBCUTANEOUS
Status: DISCONTINUED | OUTPATIENT
Start: 2020-11-10 | End: 2020-11-11 | Stop reason: HOSPADM

## 2020-11-10 RX ORDER — ONDANSETRON 4 MG/1
4-8 TABLET, ORALLY DISINTEGRATING ORAL EVERY 8 HOURS PRN
Qty: 4 TABLET | Refills: 0 | Status: SHIPPED | OUTPATIENT
Start: 2020-11-10 | End: 2020-12-02

## 2020-11-10 RX ORDER — AMOXICILLIN 250 MG
1-2 CAPSULE ORAL 2 TIMES DAILY
Qty: 30 TABLET | Refills: 0 | Status: SHIPPED | OUTPATIENT
Start: 2020-11-10

## 2020-11-10 RX ORDER — ONDANSETRON 2 MG/ML
4 INJECTION INTRAMUSCULAR; INTRAVENOUS EVERY 30 MIN PRN
Status: DISCONTINUED | OUTPATIENT
Start: 2020-11-10 | End: 2020-11-11 | Stop reason: HOSPADM

## 2020-11-10 RX ORDER — ACETAMINOPHEN 325 MG/1
975 TABLET ORAL ONCE
Status: COMPLETED | OUTPATIENT
Start: 2020-11-10 | End: 2020-11-10

## 2020-11-10 RX ORDER — GABAPENTIN 300 MG/1
300 CAPSULE ORAL ONCE
Status: COMPLETED | OUTPATIENT
Start: 2020-11-10 | End: 2020-11-10

## 2020-11-10 RX ORDER — IBUPROFEN 600 MG/1
600 TABLET, FILM COATED ORAL EVERY 6 HOURS
Qty: 30 TABLET | Refills: 1 | Status: SHIPPED | OUTPATIENT
Start: 2020-11-10 | End: 2020-12-02

## 2020-11-10 RX ORDER — BUPIVACAINE HYDROCHLORIDE 2.5 MG/ML
INJECTION, SOLUTION EPIDURAL; INFILTRATION; INTRACAUDAL PRN
Status: DISCONTINUED | OUTPATIENT
Start: 2020-11-10 | End: 2020-11-10 | Stop reason: HOSPADM

## 2020-11-10 RX ORDER — GLYCOPYRROLATE 0.2 MG/ML
INJECTION, SOLUTION INTRAMUSCULAR; INTRAVENOUS PRN
Status: DISCONTINUED | OUTPATIENT
Start: 2020-11-10 | End: 2020-11-10

## 2020-11-10 RX ORDER — ACETAMINOPHEN 325 MG/1
650 TABLET ORAL EVERY 4 HOURS
Qty: 90 TABLET | Refills: 1 | Status: SHIPPED | OUTPATIENT
Start: 2020-11-10 | End: 2020-12-02

## 2020-11-10 RX ORDER — OXYCODONE HYDROCHLORIDE 5 MG/1
5 TABLET ORAL EVERY 4 HOURS PRN
Status: DISCONTINUED | OUTPATIENT
Start: 2020-11-10 | End: 2020-11-11 | Stop reason: HOSPADM

## 2020-11-10 RX ORDER — CEFAZOLIN SODIUM 2 G/50ML
2 SOLUTION INTRAVENOUS
Status: COMPLETED | OUTPATIENT
Start: 2020-11-10 | End: 2020-11-10

## 2020-11-10 RX ORDER — PROPOFOL 10 MG/ML
INJECTION, EMULSION INTRAVENOUS CONTINUOUS PRN
Status: DISCONTINUED | OUTPATIENT
Start: 2020-11-10 | End: 2020-11-10

## 2020-11-10 RX ORDER — NALOXONE HYDROCHLORIDE 0.4 MG/ML
.1-.4 INJECTION, SOLUTION INTRAMUSCULAR; INTRAVENOUS; SUBCUTANEOUS
Status: DISCONTINUED | OUTPATIENT
Start: 2020-11-10 | End: 2020-11-10 | Stop reason: HOSPADM

## 2020-11-10 RX ORDER — PROPOFOL 10 MG/ML
INJECTION, EMULSION INTRAVENOUS PRN
Status: DISCONTINUED | OUTPATIENT
Start: 2020-11-10 | End: 2020-11-10

## 2020-11-10 RX ORDER — LIDOCAINE HYDROCHLORIDE 20 MG/ML
INJECTION, SOLUTION INFILTRATION; PERINEURAL PRN
Status: DISCONTINUED | OUTPATIENT
Start: 2020-11-10 | End: 2020-11-10

## 2020-11-10 RX ORDER — OXYCODONE HYDROCHLORIDE 5 MG/1
5 TABLET ORAL
Status: DISCONTINUED | OUTPATIENT
Start: 2020-11-10 | End: 2020-11-11 | Stop reason: HOSPADM

## 2020-11-10 RX ORDER — SODIUM CHLORIDE, SODIUM LACTATE, POTASSIUM CHLORIDE, CALCIUM CHLORIDE 600; 310; 30; 20 MG/100ML; MG/100ML; MG/100ML; MG/100ML
INJECTION, SOLUTION INTRAVENOUS CONTINUOUS
Status: DISCONTINUED | OUTPATIENT
Start: 2020-11-10 | End: 2020-11-11 | Stop reason: HOSPADM

## 2020-11-10 RX ORDER — OXYCODONE HYDROCHLORIDE 5 MG/1
5-10 TABLET ORAL EVERY 4 HOURS PRN
Qty: 20 TABLET | Refills: 0 | Status: SHIPPED | OUTPATIENT
Start: 2020-11-10 | End: 2020-12-02

## 2020-11-10 RX ORDER — ONDANSETRON 2 MG/ML
INJECTION INTRAMUSCULAR; INTRAVENOUS PRN
Status: DISCONTINUED | OUTPATIENT
Start: 2020-11-10 | End: 2020-11-10

## 2020-11-10 RX ORDER — BUPIVACAINE HYDROCHLORIDE 2.5 MG/ML
INJECTION, SOLUTION EPIDURAL; INFILTRATION; INTRACAUDAL PRN
Status: DISCONTINUED | OUTPATIENT
Start: 2020-11-10 | End: 2020-11-10

## 2020-11-10 RX ORDER — FENTANYL CITRATE 50 UG/ML
25-50 INJECTION, SOLUTION INTRAMUSCULAR; INTRAVENOUS
Status: DISCONTINUED | OUTPATIENT
Start: 2020-11-10 | End: 2020-11-11 | Stop reason: HOSPADM

## 2020-11-10 RX ORDER — LIDOCAINE 40 MG/G
CREAM TOPICAL
Status: DISCONTINUED | OUTPATIENT
Start: 2020-11-10 | End: 2020-11-10 | Stop reason: HOSPADM

## 2020-11-10 RX ADMIN — Medication 100 MCG: at 08:58

## 2020-11-10 RX ADMIN — ACETAMINOPHEN 975 MG: 325 TABLET ORAL at 07:08

## 2020-11-10 RX ADMIN — GLYCOPYRROLATE 0.2 MG: 0.2 INJECTION, SOLUTION INTRAMUSCULAR; INTRAVENOUS at 08:16

## 2020-11-10 RX ADMIN — KETOROLAC TROMETHAMINE 30 MG: 30 INJECTION, SOLUTION INTRAMUSCULAR; INTRAVENOUS at 10:01

## 2020-11-10 RX ADMIN — GABAPENTIN 300 MG: 300 CAPSULE ORAL at 07:08

## 2020-11-10 RX ADMIN — EPHEDRINE SULFATE 5 MG: 50 INJECTION, SOLUTION INTRAMUSCULAR; INTRAVENOUS; SUBCUTANEOUS at 08:36

## 2020-11-10 RX ADMIN — LIDOCAINE HYDROCHLORIDE 60 MG: 20 INJECTION, SOLUTION INFILTRATION; PERINEURAL at 08:08

## 2020-11-10 RX ADMIN — EPHEDRINE SULFATE 10 MG: 50 INJECTION, SOLUTION INTRAMUSCULAR; INTRAVENOUS; SUBCUTANEOUS at 08:22

## 2020-11-10 RX ADMIN — CEFAZOLIN SODIUM 1 G: 1 SOLUTION INTRAVENOUS at 10:07

## 2020-11-10 RX ADMIN — BUPIVACAINE HYDROCHLORIDE 10 ML: 2.5 INJECTION, SOLUTION EPIDURAL; INFILTRATION; INTRACAUDAL at 07:35

## 2020-11-10 RX ADMIN — SODIUM CHLORIDE, SODIUM LACTATE, POTASSIUM CHLORIDE, CALCIUM CHLORIDE: 600; 310; 30; 20 INJECTION, SOLUTION INTRAVENOUS at 09:20

## 2020-11-10 RX ADMIN — ONDANSETRON 4 MG: 2 INJECTION INTRAMUSCULAR; INTRAVENOUS at 08:25

## 2020-11-10 RX ADMIN — EPHEDRINE SULFATE 10 MG: 50 INJECTION, SOLUTION INTRAMUSCULAR; INTRAVENOUS; SUBCUTANEOUS at 08:12

## 2020-11-10 RX ADMIN — Medication 100 MCG: at 09:04

## 2020-11-10 RX ADMIN — Medication 100 MCG: at 08:28

## 2020-11-10 RX ADMIN — CEFAZOLIN SODIUM 2 G: 2 SOLUTION INTRAVENOUS at 08:17

## 2020-11-10 RX ADMIN — PROPOFOL 200 MG: 10 INJECTION, EMULSION INTRAVENOUS at 08:08

## 2020-11-10 RX ADMIN — Medication 100 MCG: at 08:49

## 2020-11-10 RX ADMIN — Medication 100 MCG: at 08:23

## 2020-11-10 RX ADMIN — PROPOFOL 150 MCG/KG/MIN: 10 INJECTION, EMULSION INTRAVENOUS at 08:08

## 2020-11-10 RX ADMIN — SODIUM CHLORIDE, SODIUM LACTATE, POTASSIUM CHLORIDE, CALCIUM CHLORIDE: 600; 310; 30; 20 INJECTION, SOLUTION INTRAVENOUS at 07:57

## 2020-11-10 RX ADMIN — Medication 100 MCG: at 08:40

## 2020-11-10 RX ADMIN — Medication 100 MCG: at 08:52

## 2020-11-10 RX ADMIN — EPHEDRINE SULFATE 5 MG: 50 INJECTION, SOLUTION INTRAMUSCULAR; INTRAVENOUS; SUBCUTANEOUS at 08:39

## 2020-11-10 ASSESSMENT — MIFFLIN-ST. JEOR: SCORE: 1645.4

## 2020-11-10 NOTE — ANESTHESIA PROCEDURE NOTES
Peripheral Nerve Block Procedure Note      Staff -   Anesthesiologist:  Mati Daniel MD  Performed By: anesthesiologist  Location: Pre-op  Procedure Start/Stop TImes:     patient identified, IV checked, site marked, risks and benefits discussed, informed consent, monitors and equipment checked, pre-op evaluation, at physician/surgeon's request and post-op pain management      Correct Patient: Yes      Correct Position: Yes      Correct Site: Yes      Correct Procedure: Yes      Correct Laterality:  Yes    Site Marked:  Yes  Procedure details:     Procedure:  Interscalene    ASA:  2    Diagnosis:  Postoperative pain relief    Laterality:  Left    Position:  Sitting    Sterile Prep: chloraprep, mask and sterile gloves      Local skin infiltration:  None    Needle:  Insulated    Needle gauge:  21    Needle length (mm):  110    Ultrasound: Yes      Ultrasound used to identify targeted nerve, plexus, or vascular structure and placed a needle adjacent to it      Permanent Image entered into patiient's record      Abnormal pain on injection: No      Blood Aspirated: No      Paresthesias:  No    Bleeding at site: No      Bolus via:  Needle    Infusion Method:  Single Shot    Complications:  None  Assessment/Narrative:     Injection made incrementally with aspirations every (mL):  5     133mg exparel used

## 2020-11-10 NOTE — ANESTHESIA CARE TRANSFER NOTE
Patient: Nicolas Mason    Procedure(s):  Left arthroscopic cuff repair, subacromial decompression, open biceps tenodesis    Diagnosis: Complete tear of left rotator cuff, unspecified whether traumatic [M75.122]  Diagnosis Additional Information: No value filed.    Anesthesia Type:   General, Peripheral Nerve Block, For Post-op pain in coordination with surgeon     Note:  Airway :Face Mask  Patient transferred to:PACU  Comments: 119/73  98%  97.4-60-12   Handoff Report: Identifed the Patient, Identified the Reponsible Provider, Reviewed the pertinent medical history, Discussed the surgical course, Reviewed Intra-OP anesthesia mangement and issues during anesthesia, Set expectations for post-procedure period and Allowed opportunity for questions and acknowledgement of understanding      Vitals: (Last set prior to Anesthesia Care Transfer)    CRNA VITALS  11/10/2020 1000 - 11/10/2020 1035      11/10/2020             Resp Rate (observed):  (!) 2                Electronically Signed By: JANAY Leyva CRNA  November 10, 2020  10:35 AM

## 2020-11-10 NOTE — ANESTHESIA POSTPROCEDURE EVALUATION
Anesthesia POST Procedure Evaluation    Patient: Nicolas Mason   MRN:     [id number] Gender:   male   Age:    60 year old :      1960        Preoperative Diagnosis: Complete tear of left rotator cuff, unspecified whether traumatic [M75.122]   Procedure(s):  Left arthroscopic cuff repair, subacromial decompression, open biceps tenodesis   Postop Comments: No value filed.     Anesthesia Type: General, Peripheral Nerve Block, For Post-op pain in coordination with surgeon       Disposition: Outpatient   Postop Pain Control: Uneventful            Sign Out: Well controlled pain   PONV: No   Neuro/Psych: Uneventful            Sign Out: Acceptable/Baseline neuro status   Airway/Respiratory: Uneventful            Sign Out: Acceptable/Baseline resp. status   CV/Hemodynamics: Uneventful            Sign Out: Acceptable CV status   Other NRE: NONE   DID A NON-ROUTINE EVENT OCCUR? No         Last Anesthesia Record Vitals:  CRNA VITALS  11/10/2020 1000 - 11/10/2020 1100      11/10/2020             Resp Rate (observed):  (!) 2          Last PACU Vitals:  Vitals Value Taken Time   /76 11/10/20 1100   Temp 36.3  C (97.4  F) 11/10/20 1033   Pulse 61 11/10/20 1102   Resp 17 11/10/20 1102   SpO2 89 % 11/10/20 1102   Temp src     NIBP     Pulse     SpO2     Resp     Temp     Ht Rate     Temp 2     Vitals shown include unvalidated device data.      Electronically Signed By: Mati Daniel MD, November 10, 2020, 11:51 AM

## 2020-11-10 NOTE — OR NURSING
Patient received left side Interscalene nerve block  with Exparel.  Versed 2mg given. Tolerated procedure well.

## 2020-11-10 NOTE — OP NOTE
DATE OF PROCEDURE: 11/10/2020     PREOPERATIVE DIAGNOSES:   1. Left rotator cuff tear.   2. Left long head biceps disease.   3. Left subacromial bursitis.     POSTOPERATIVE DIAGNOSES:   1. Left rotator cuff tear  2. Left long head biceps disease.  3. Left subacromial bursitis     PROCEDURES:   1. Left arthroscopic rotator cuff repair.   2. Left arthroscopic glenohumeral debridement, extensive  3. Left subacromial bursectomy without bony acromioplasty.   4. Left open biceps tenodesis    STAFF SURGEON: Sariah Millan MD   ASSISTANT: Jose Cruz Wen PA-C   The assistance of Jose Cruz Wen was necessitated by the complexity of the case and need for an assistant including instrument management in repair of this tear as well as the absence of a suitably qualified available orthopaedic resident to assist.    ANESTHESIA: General endotracheal anesthesia with supplementary interscalene block.   ESTIMATED BLOOD LOSS: 5 mL.     IMPLANTS: Arthrex 4.75 knotless Bio-SwiveLock x 3, 5.5 SwivelLock x2, 1.9 Fibertak biceps.   COMPLICATIONS: None.     BRIEF PATIENT HISTORY: Nicolas Mason is a 60 year old male I have seen in clinic. Please refer to that documentation. He has an MRI which demonstrated a full thickness tear involving the supra and infra. The patient has had nonoperative management but has not had adequate lasting relief of pain and is at this time having lifestyle limiting symptoms. He wishes at this time to proceed with surgical intervention. We had discussed the risks and benefits of both non-operative and surgical management. We discussed the expected postoperative restrictions. We discussed the risks of surgery including failure of the cuff to heal or risk of retear, risk of being no better or even worse if he  became stiff, infected, had an injury to the nerves or arteries which power the arm or hand or had a reaction to anesthesia. We discussed the postoperative rehabilitation course. The patient wished to  proceed with surgery and informed consent was completed.    DESCRIPTION OF PROCEDURE: The patient was identified in the preoperative area and the correct left shoulder was marked for surgery. The patient was provided an interscalene block by our Anesthesia colleagues and was taken to the operating room where he was surrendered to general endotracheal anesthesia. The patient was moved to the operating table in the beachchair position with all bony prominences well padded. The head was placed in a head bryan with the neck in neutral position. The left upper extremity was prepped and draped in the usual sterile fashion. A timeout was held in accordance with hospital policy, confirming correct patient, side, site, procedure and administration of IV antibiotics prior to incision.   I initiated shoulder arthroscopy through a posterior viewing portal. I created an anterior working portal under direct visualization. I performed a diagnostic arthroscopy. There was a mobile superior labral tear. The long head of the biceps was intact. The subscap was intact. There was a full thickness supra and infra tear but the teres was intact. There were no loose bodies in the axillary pouch. There were some grade 1 chondral changes.   I tenotomized the long head of the biceps and allowed it to retract out of the shoulder. I debrided the stump back to a stable edge. I debrided the anterior, superior, and posterior labrum. I then moved to the subacromial space and performed a thorough subacromial bursectomy. Upon entering the subacromial space, massive and significant bursitis was encountered. I debrided this with a shaver and an ablator. I denuded the undersurface of all soft tissues except the CA ligament.   I debrided the edge of the cuff tendon as it was quite thin an atrophic. I got back to a relatively good tendon quality and was still able to bring the tendon to the tuberosity for a double row repair. I debrided the greater  tuberosity. I placed 3 medial 4.75 knotless Bio-SwiveLock anchors. I passed the sutures using a FiberLink stitch. The repair stitch from the posterior anchor was passed through the middle anchor. The repair stitch from the middle anchor was passed through the anterior anchor. All sutures were then brought laterally to 2 5.5 Bio-SwiveLock anchors including FiberLink grasping stitches anterior to this. This nicely reapproximated the tendon to the greater tuberosity. I then moved to the biceps tenodesis.  I made a longitudinal incision near the axilla centered over the inferior edge of the pectoralis. I dissected down to the pec and came underneath the muscle where I identified the long head of the biceps. This tendon was delivered into the wound. I then exposed the site for tenodesis beneath the pectoralis below the biceps groove. I placed a bessie retractor and long thin rich retractor to safely expose the site.  I used a ochoa to remove periosteum from the onlay tenodesis site. I placed a 1.9 fibertak biceps anchor and passed the sutures in running locking fashion through the tendon 2 cm proximal to the muscle tendon junction. I removed the tendon proximal to the sutures. I tied down these sutures to effect an onlay tenodesis and there was appropriate tension on the tendon.  All instruments were removed from the shoulder and then portals were closed with interrupted 3-0 Monocryl. Steri-Strips and a soft sterile dressing were applied. The arm was placed into an abduction sling and the patient was extubated and transported to the recovery room in stable condition. There were no apparent intraoperative complications.     POSTOPERATIVE PLAN:   1. The patient will be discharged to home when he meets Same Day discharge criteria.   2. The patient will have no range of motion of the shoulder for 6 weeks and can do active hand, wrist and elbow range of motion.   3. At 6 weeks, the patient will start passive and active  assisted range of motion and progress to active range of motion over the following 6 weeks with strengthening at 3 months.     NORMA LAM MD

## 2020-11-10 NOTE — BRIEF OP NOTE
[facility]    Brief Operative Note    Pre-operative diagnosis: Complete tear of left rotator cuff, unspecified whether traumatic [M75.122]  Post-operative diagnosis Same as pre-operative diagnosis    Procedure: Procedure(s):  Left arthroscopic cuff repair, subacromial decompression, open biceps tenodesis  Surgeon: Surgeon(s) and Role:     * Sariah Millan MD - Primary     * Jose Cruz Wen PA-C - Assisting      * Jv Benoit MD- Fellow Assisting     *           , MD Marcell - Resident Assisting  Anesthesia: Combined General with Block   Estimated blood loss: 10cc  Drains: None  Specimens: * No specimens in log *  Findings:   None.  Complications: None.  Implants:   Implant Name Type Inv. Item Serial No.  Lot No. LRB No. Used Action   ARTHREX BIOCOMPOSITE KNOTLESS SWIVELOCK SUTURE ANCHOR     [id number] Left 2 Implanted   ARTHREX BIOCOMPOSITE KNOTLESS SWIVELOCK ANCHOR     [id number] Left 1 Implanted   IMP ANCHOR ARTHREX BIO-SWIVELOCK 5.5MM [id number] [employer]allic [employer]ware/[place] IMP ANCHOR ARTHREX BIO-SWIVELOCK 5.5MM [id number]  ARTHREX [id number] Left 1 Implanted   IMP ANCHOR ARTHREX BIO-SWIVELOCK 5.5MM [id number] Metallic Hardware/[place] IMP ANCHOR ARTHREX BIO-SWIVELOCK 5.5MM [id number]  ARTHREX [id number] Left 1 Implanted   ARTHREX FIBERTAK BICEPS IMPLANT     [id number] Left 1 Implanted       PLAN:  - Discharge home in stable condition  - Oxycodone, Tylenol, Ibuprofen for pain   - Dressing change POD3, replace with bandaids/gauze/tape PRN  - NWB with LUE in sling at all times except PT + hygiene  - PT: Follow RCR protocol  - Follow up within 2 weeks for suture removal

## 2020-11-10 NOTE — DISCHARGE INSTRUCTIONS
"            Information about liposomal bupivacaine (Exparel)    What is Liposomal Bupivacaine?    Liposomal Bupivacaine is a numbing medication that can help you manage your pain after surgery.  This medication is similar to \"novacaine,\" which is often used by the dentist.  Liposomal bupivacaine is released slowly and can help control pain for up to 72 hours.    What is the purpose of Liposomal Bupivacaine?    To manage your pain after surgery    To help you sleep better, take deep breaths, walk more comfortable, and feel up to visiting with others    How is the procedure done?    Liposomal bupivacaine is a medication given by an injection.    It is usually given right before your surgery.  If this is the case, you will be awake or sedated, but you should experience minimal pain during the procedure.    For some people, the injection may be given at the very end of your surgery.  It all depends on the type of surgery and your situation.    The procedure usually takes about 5-15 minutes.  An ultrasound machine will help the anesthesiologist insert it in the right place or the surgeon will inject it under direct vision.     A needle is used to place the numbing medication under your skin.  It provides pain relief by numbing the tissue in the area where your surgeon will make the incision.    What can I expect?    You may experience numbness, tingling, or a feeling of heaviness around the area that was injected.    If you experience any of the follow symptoms IMMEDIATELY CALL THE REGIONAL ANESTHESIA PAIN SERVICE:    Numbness or tingling occurs in areas other than around the injection site    Blurry vision    Ringing in your ears    A metallic taste in your mouth    PAGE: Dial [phone].  When prompted, enter the following 4-digit ID number:  [id number].  You will be prompted to enter your phone number; and then enter the # sign.  The clinician on call will call you back.    OR    CALL: Dial [phone].  Let the " hospital  know that you are having a problem with a nerve block and that you would like to speak to the regional anesthesia pain service right away.    You should not receive any other type of numbing medication within 4 days after receiving liposomal bupivacaine unless your anesthesiologist approves.      Post Operative Instructions: Regional Anesthetic for Upper Extremity with Liposomal Bupivacaine  General Information:   Regional anesthesia is when local anesthetic or  numbing  medication is injected around the nerves to anesthetize or  numb  the area supplied by that set of nerves. It is a type of analgesia used to control pain and decreases the need for narcotics following surgery.    Types of Regional Blocks:  Interscalene: A block injected into the neck on the operative shoulder/arm of a patient having shoulder surgery  Supraclavicular: A block injected near the clavicle on the operative shoulder of a patient having elbow, forearm, or hand surgery    Procedure:  The type of anesthesia your doctor used to numb your shoulder or arm will usually not start to wear off for 24-48 hours, but may last as long as 72 hours. You should be careful during that period, since it is possible to injure your arm without being aware of the injury. While your arm is numb, you should:    Avoid striking or bumping your arm    Avoid extreme hot or cold    Diet:  There are no restrictions on your diet. You should drink plenty of fluids.     Discomfort:  You will have a tingling and prickly sensation in your arm as the feeling begins to return. You can also expect some discomfort. The amount of discomfort is unpredictable, but if you have more pain than can be controlled with pain medication you should notify your physician.     Pain Medications:  Begin taking your oral pain pills before bedtime and during the night to avoid a sudden onset of pain as part of the block wears off.  Do not engage in drinking, driving, or  "hazardous occupations while taking pain medication.     Stitches:   You may have stitches or special skin closures. You doctor will inform you when to return to the office to have them removed.     Activity:  On the day of surgery you should try to stay in bed with your hand elevated on pillows. You may resume your normal activity after that, wearing a sling for comfort. Contact your physician if you have any of the problems:     Continued numbness or tingling in the arm or hand after 72 hours    Swelling of the fingers or fingers that are cold to the touch    Excessive bleeding or drainage    Severe pain              [facility]  Home Care Following Anesthesia  For 24 hours after surgery:  1. Get plenty of rest.  A responsible adult must stay with you for at least 24 hours after you leave the surgery center.  2. Do not drive or use heavy equipment.  If you have weakness or tingling, don't drive or use heavy equipment until this feeling goes away.   3. Do not drink alcohol.   4. Avoid strenuous or risky activities.  Ask for help when climbing stairs.  5. You may feel lightheaded.  IF so, sit for a few minutes before standing.  Have someone help you get up.   6. If you have nausea (feel sick to your stomach): Drink only clear liquids such as apple juice, ginger ale, broth or 7-Up.  Rest may also help.  Be sure to drink enough fluids.  Move to a regular diet as you feel able.   7. You may have a slight fever.  Call the doctor if your fever is over 100 F (37.7 C) (taken under the tongue) or lasts longer than 24 hours.  8. You may have a dry mouth, a sore throat, muscle aches or trouble sleeping. These should go away after 24 hours.  9. Do not make important or legal decisions.        Today you received an Exparel block to numb the nerves near your surgery site.  This is a block using local anesthetic or \"numbing\" medication injected around the nerves to anesthetize or \"numb\" the " area supplied by those nerves.  This block is injected into the muscle layer near your surgical site.  This medication may numb the location where you had surgery up to 72 hours.  If your surgical site is an arm or leg you should be careful with your affected limb, since it is possible to injure your limb without being aware of it due to the numbing.  Until full feeling returns, you should guard against bumping or hitting your limb, and avoid extreme hot or cold temperatures on the skin.  As the block wears off, the feeling will return as a tingling or prickly sensation near your surgical site.  You will experince more discomfort from your incision as the feeling returns.  You may want to take a pain pill (a narcotic or Tylenol if this was prescribed by your surgeon) when you start to experience mild pain before the pain beomes more severe.  If your pain medications do not control your pain, you should notify your surgeon.    Tips for taking pain medications  To get the best pain relief possible, remember these points:    Take pain medications as directed, before pain becomes severe.    Pain medication can upset your stomach: taking it with food may help.    Constipation is a common side effect of pain medication. Drink plenty of  fluids.    Eat foods high in fiber. Take a stool softener if recommended by your doctor or pharmacist.    Do not drink alcohol, drive or operate machinery while taking pain medications.    Ask about other ways to control pain, such as with heat, ice or relaxation.    Tylenol/Acetaminophen Consumption  To help encourage the safe use of acetaminophen, the makers of TYLENOL  have lowered the maximum daily dose for single-ingredient Extra Strength TYLENOL  (acetaminophen) products sold in the [place] from 8 pills per day (4,000 mg) to 6 pills per day (3,000 mg). The dosing interval has also changed from 2 pills every 4-6 hours to 2 pills every 6 hours.    If you feel your pain relief is  insufficient, you may take Tylenol/Acetaminophen in addition to your narcotic pain medication.     Be careful not to exceed 3,000 mg of Tylenol/Acetaminophen in a 24 hour period from all sources.    If you are taking extra strength Tylenol/acetaminophen (500 mg), the maximum dose is 6 tablets in 24 hours.    If you are taking regular strength acetaminophen (325 mg), the maximum dose is 9 tablets in 24 hours.    Call a doctor for any of the followin. Signs of infection (fever, growing tenderness at the surgery site, a large amount of drainage or bleeding, severe pain, foul-smelling drainage, redness, swelling).  2. It has been over 8 to 10 hours since surgery and you are still not able to urinate (pass water).  3. Headache for over 24 hours.  4. Numbness, tingling or weakness the day after surgery (if you had spinal anesthesia).  5. Signs of Covid-19 infection (temperature over 100 degrees, shortness of breath, cough, loss of taste/smell, generalized body aches, persistent headache, chills, sore throat, nausea/vomiting/diarrhea)  Your doctor is:    Dr. Sariah Millan, Orthopaedics: [phone]                  After hours & Weekends dial [phone] and ask for the resident on call for:  Orthopaedics  For emergency care, call the:  [facility] Emergency Department: [phone] (TTY for hearing impaired: [phone])

## 2020-11-11 ENCOUNTER — TELEPHONE (OUTPATIENT)
Dept: ORTHOPEDICS | Facility: CLINIC | Age: 60
End: 2020-11-11

## 2020-11-11 NOTE — PROVIDER NOTIFICATION
Post op call made.  Pt said he was about to call the anesthesia number provided regarding his left arm feeling more numb today compared to yesterday and some ringing in his ear.  Pt had nerve block. I verified patient had that number to call.  I explained how to elevate his left arm while using the sling and how move hand/fingers hourly and use the squeeze ball.  He also wanted to pass on that he has a 4 inch skin cut where the blue cooling device was rubbing against his neck, left  side.   He didn't feel it due to the numbness from the block but noticed it today as he adjusted his shoulder equipment.  Miguel KINCAID RN

## 2020-11-11 NOTE — TELEPHONE ENCOUNTER
Patient stated he was able to move his upper arm and forearm yesterday.  He reports he is unable to do this motion today.  He states he now only has tingling in the thumb and wrist motion.  The anesthesiologist was contacted and consulted with Dr Millan..  Patient reports no obvious bleeding or increase in swelling.  He was instructed to continue to monitor symptoms and call for any changes or concerns.  He was also told he can come to the walk-in clinic to be evaluated.  Patient verbalized understanding.

## 2020-11-29 ENCOUNTER — HEALTH MAINTENANCE LETTER (OUTPATIENT)
Age: 60
End: 2020-11-29

## 2020-12-02 ENCOUNTER — OFFICE VISIT (OUTPATIENT)
Dept: ORTHOPEDICS | Facility: CLINIC | Age: 60
End: 2020-12-02
Payer: COMMERCIAL

## 2020-12-02 ENCOUNTER — OFFICE VISIT (OUTPATIENT)
Dept: DERMATOLOGY | Facility: CLINIC | Age: 60
End: 2020-12-02
Payer: COMMERCIAL

## 2020-12-02 DIAGNOSIS — M75.122 COMPLETE TEAR OF LEFT ROTATOR CUFF, UNSPECIFIED WHETHER TRAUMATIC: Primary | ICD-10-CM

## 2020-12-02 DIAGNOSIS — L72.0 EIC (EPIDERMAL INCLUSION CYST): ICD-10-CM

## 2020-12-02 DIAGNOSIS — L82.0 INFLAMED SEBORRHEIC KERATOSIS: Primary | ICD-10-CM

## 2020-12-02 DIAGNOSIS — L91.8 INFLAMED SKIN TAG: ICD-10-CM

## 2020-12-02 PROCEDURE — 99202 OFFICE O/P NEW SF 15 MIN: CPT | Mod: 25 | Performed by: DERMATOLOGY

## 2020-12-02 PROCEDURE — 17110 DESTRUCTION B9 LES UP TO 14: CPT | Mod: GC | Performed by: DERMATOLOGY

## 2020-12-02 PROCEDURE — 99024 POSTOP FOLLOW-UP VISIT: CPT | Mod: GC | Performed by: ORTHOPAEDIC SURGERY

## 2020-12-02 PROCEDURE — 11200 RMVL SKIN TAGS UP TO&INC 15: CPT | Mod: 59 | Performed by: DERMATOLOGY

## 2020-12-02 ASSESSMENT — PAIN SCALES - GENERAL: PAINLEVEL: NO PAIN (0)

## 2020-12-02 NOTE — NURSING NOTE
Reason For Visit:   Chief Complaint   Patient presents with     Surgical Followup     2 week pop DOS 11/10/20 Left arthroscopic cuff repair, subacromial decompression, open biceps tenodesis        PCP: Jackie Guerrero  Ref: Dr. Gutierres     ?  No  Occupation [employer]   Currently working? Yes.  Work status?  Part-time.  Date of injury: gymnastics in high school and college - chronic     Date of surgery: 11/10/20  Type of surgery:   1. Left arthroscopic rotator cuff repair.   2. Left arthroscopic glenohumeral debridement, extensive  3. Left subacromial bursectomy without bony acromioplasty.   4. Left open biceps tenodesis  Smoker: No  Request smoking cessation information: No     Right hand dominant    SANE score  Affected shoulder: Left  Right shoulder SANE: 65  Left shoulder SANE: 10    There were no vitals taken for this visit.      Pain Assessment  Patient Currently in Pain: Yes  0-10 Pain Scale: 1  Primary Pain Location: Shoulder(Left)  Pain Descriptors: Numbness  Aggravating Factors: Movement    Corinne Cm, ATC

## 2020-12-02 NOTE — PROGRESS NOTES
"[facility] Dermatology Note      Dermatology Problem List:  1. Seborrheic keratosis, inflamed  2. Acrochordons, irritated (on right arm pit)  3. Epidermal Inclusion Cysts/Dilated Pore of [place] on back (x3)    CC:   Chief Complaint   Patient presents with     Derm Problem     Akbar has one mole of concern. He also has some skin tags that he would like to have frozen off.     Encounter Date: Dec 2, 2020    History of Present Illness:  Mr. Nicolas Mason is a 60 year old male with PMHx relevant for Essential Hypertension, ADHD, MDD (recurrent) and Left rotator cuff tendinopathy (s/p Arthroscopic repair 11/2020) who presented today for a skin check. Patient reports a lesion on his right flank (with orange-skin colored pigment) which he recalls being there for at least the last 5 years. Most recently he reports itchiness associated to it. He denies any oozing, bleeding or changes to counter (however, he thinks it may be \"thicker\"). He denies personal history history of melanoma or non-melanoma skin cancers. Unaware of dermatological  family history, since his family members were Gn[employer] that did not seek medical care as part of their reyes.    He reports having multiple skin tags as well on his armpits and shoulders for which he uses some \"pineapple juice\" to promote sloughing of the skin.      Otherwise, patient has no additional medical concerns to discuss today.     Past Medical History:   Past Medical History:   Diagnosis Date     Hypertension     Controled with medication     Other chronic pain     Both shoulders, right elbow.      Past Surgical History:   Procedure Laterality Date     ARTHROSCOPY SHOULDER, OPEN BICEP TENODESIS REPAIR, COMBINED Left 11/10/2020    Procedure: Left arthroscopic cuff repair, subacromial decompression, open biceps tenodesis;  Surgeon: Sariah Millan MD;  Location: [facility] OR       Social History:   reports that he has never smoked. He has never used " smokeless tobacco. He reports that he does not drink alcohol or use drugs.    Family History:  No family history on file.    Medications:  Current Outpatient Medications   Medication Sig Dispense Refill     ALPRAZolam (XANAX) 0.25 MG tablet Take 0.25 mg by mouth 2 times daily       amphetamine-dextroamphetamine (ADDERALL) 10 MG tablet Take 2 tabs in the morning and 1 tab at noon       aspirin (ASA) 81 MG EC tablet Take 81 mg by mouth daily       atorvastatin (LIPITOR) 20 MG tablet        caffeine (NO-DOZE) 200 MG TABS tablet Take 200 mg by mouth daily       clonazePAM (KLONOPIN) 0.5 MG tablet Take 0.5 mg by mouth nightly as needed for anxiety       finasteride (PROSCAR) 5 MG tablet Take 2.5 mg by mouth daily       lisinopril-hydrochlorothiazide (ZESTORETIC) 20-12.5 MG tablet Take 1 tablet by mouth daily       Multiple Vitamins-Minerals (MULTIVITAMIN ADULT PO) Take 1 tablet by mouth daily       Omega-3 Fatty Acids (OMEGA-3 FISH OIL) 1200 MG CAPS Take 1,200 mg by mouth daily       potassium chloride ER (KLOR-CON M) 10 MEQ CR tablet Take 10 mEq by mouth 2 times daily       propranolol ER (INDERAL LA) 60 MG 24 hr capsule Take 60 mg by mouth daily       senna-docusate (SENOKOT-S/PERICOLACE) 8.6-50 MG tablet Take 1-2 tablets by mouth 2 times daily 30 tablet 0     sertraline (ZOLOFT) 100 MG tablet Take 200 mg by mouth daily       traZODone (DESYREL) 50 MG tablet Take 100 mg by mouth At Bedtime       Allergies   Allergen Reactions     Atorvastatin GI Disturbance     Sulfa Drugs Rash     Groin RAsh         Review of Systems:  - Skin/Heme New Pt: The patient denies frequent sun exposure. The patient denies excessive scarring or problems healing except as per HPI.   - Constitutional: The patient denies fatigue, fevers, chills, unintended weight loss, and night sweats.  - HEENT: Patient denies nonhealing oral sores.  - Skin: As above in HPI. No additional skin concerns.  - MSK: no arthralgias or myalgias    Physical  exam:  Vitals: There were no vitals taken for this visit.  GEN: This is a well developed, well-nourished male in no acute distress, in a pleasant mood.    SKIN: Reis phototype I  - Focused examination of the trunk and back was performed.  - There is a waxy stuck on tan to brown papule on the right flank.  - There are skin colored pedunculated papules on the right and left arm pits  - There is skin colored pedunculated papules with erythema on the right shoulder  - There are dilated pores with keratin filled material on his back (x3)  - No other lesions of concern on areas examined.     Impression/Plan:    1. Seborrheic keratosis, inflamed    Discussed its benign features and clinical prognosis    Cryotherapy procedure note: After verbal consent and discussion of risks and benefits including but no limited to dyspigmentation/scar, blister, and pain, was treated with 1-2mm freeze border for 2 cycles with liquid nitrogen. Post cryotherapy instructions were provided.    We will clinically monitor this area.    2. Acrochordons, irritated (on right arm pit)    Cryotherapy procedure note: After verbal consent and discussion of risks and benefits including but no limited to dyspigmentation/scar, blister, and pain, were treated with 1-2mm freeze border for 2 cycles with liquid nitrogen. Post cryotherapy instructions were provided.    We will continue to monitor clinically    3. Epidermal Inclusion Cysts/Dilated Pore of [place] on back (x3)    We will continue to monitor clinically    Follow-up in 1 years, earlier for new or changing lesions.      Case discussed with attending physician, Guillermo George  Internal Medicine Residency, PGY-2  [facility]   p. [phone]      Staff Physician Comments:   I saw and evaluated the patient with the resident and I agree with the assessment and plan.  I was present for the entire minor procedure and examination.    Guillermo  MD Marcelnio  Pronouns: he/him/his    Department of Dermatology  [facility]: Phone: [phone], Fax:[phone]  [facility]: Phone: [phone] Fax: [phone]

## 2020-12-02 NOTE — LETTER
12/2/2020         RE: Nicolas Mason  [address] [place] [id number]        Dear Colleague,    Thank you for referring your patient, Nicolas Mason, to the [facility] [place]. Please see a copy of my visit note below.    Procedure: Left arthroscopic cuff repair, subacromial decompression, open biceps tenodesis   DOS: 11/10/2020    HISTORY OF PRESENT ILLNESS:  Nicolas Mason is a 60 year old male who presents for postoperative check for above procedure. Overall he states he is doing well and has not been in any pain. He has been doing his own physical therapy by searching for videos online. We discussed the importance of not undergoing extreme ranges of motion and do not recommend formal shoulder physical therapy until 6 weeks after his procedure.    PHYSICAL EXAM:  General: NAD resting comfortably  Pulmonary: breathing comfortably on RA  Cardiovascular: regular rate and rhythm assess by peripheral pulse  LUE:  - incisions well aproximated. No active drainage or bleeding.  - Non tender to palpation over shoulder.  - did not assess ROM of left shoulder  - SILT in ax, median, ulnar and radial distributions  - radial pulse 2+, fingers wwp    IMAGING:  No new imaging     ASSESSMENT:  1) 3 weeks s/p left arthroscopic rotator cuff repair, subacromial decompression and open biceps tenodesis.     PLAN:  Patient began doing home physical therapy exercises two weeks post op based on his interpretation of his postop instructions. We recommended that he begin exercises at 6 weeks post op. We discussed doing formal physical therapy at 6 weeks post op, however the clinic in [place] is only doing virtual therapy. The patient states that instead of virtual therapy he would rather search for videos online. He does not wish to travel to the [place] for in person therapy. Therefore we printed out therapy exercises for him to do at home and instructed him which exercises to do and when  to begin.   - continued sling use until 6 weeks post op  - no pushing or pulling with left arm  - no extreme shoulder ROM  - coffee cup weightbearing on CRIS Stewart MD   Orthopaedic Surgery, PGY-1    I have personally examined this patient and have reviewed the clinical presentation and progress note with the resident.  I agree with the treatment plan as outlined.  The plan was formulated with the resident on the day of the resident's note.   Sariah Millan MD

## 2020-12-02 NOTE — PROGRESS NOTES
Procedure: Left arthroscopic cuff repair, subacromial decompression, open biceps tenodesis   DOS: 11/10/2020    HISTORY OF PRESENT ILLNESS:  Nicolas Mason is a 60 year old male who presents for postoperative check for above procedure. Overall he states he is doing well and has not been in any pain. He has been doing his own physical therapy by searching for videos online. We discussed the importance of not undergoing extreme ranges of motion and do not recommend formal shoulder physical therapy until 6 weeks after his procedure.    PHYSICAL EXAM:  General: NAD resting comfortably  Pulmonary: breathing comfortably on RA  Cardiovascular: regular rate and rhythm assess by peripheral pulse  LUE:  - incisions well aproximated. No active drainage or bleeding.  - Non tender to palpation over shoulder.  - did not assess ROM of left shoulder  - SILT in ax, median, ulnar and radial distributions  - radial pulse 2+, fingers wwp    IMAGING:  No new imaging     ASSESSMENT:  1) 3 weeks s/p left arthroscopic rotator cuff repair, subacromial decompression and open biceps tenodesis.     PLAN:  Patient began doing home physical therapy exercises two weeks post op based on his interpretation of his postop instructions. We recommended that he begin exercises at 6 weeks post op. We discussed doing formal physical therapy at 6 weeks post op, however the clinic in [place] is only doing virtual therapy. The patient states that instead of virtual therapy he would rather search for videos online. He does not wish to travel to the [place] for in person therapy. Therefore we printed out therapy exercises for him to do at home and instructed him which exercises to do and when to begin.   - continued sling use until 6 weeks post op  - no pushing or pulling with left arm  - no extreme shoulder ROM  - coffee cup weightbearing on LUE    Marcell Stewart MD   Orthopaedic Surgery, PGY-1    I have personally examined this patient and have reviewed  the clinical presentation and progress note with the resident.  I agree with the treatment plan as outlined.  The plan was formulated with the resident on the day of the resident's note.   Sariah Millan MD

## 2020-12-02 NOTE — LETTER
"12/2/2020       RE: Nicolas Mason  [address] [facility] [address]     Dear Colleague,    Thank you for referring your patient, Nicolas Mason, to the [facility] [place] at [facility]. Please see a copy of my visit note below.    [facility] Dermatology Note      Dermatology Problem List:  1. Seborrheic keratosis, inflamed  2. Acrochordons, irritated (on right arm pit)  3. Epidermal Inclusion Cysts/Dilated Pore of [place] on back (x3)    CC:   Chief Complaint   Patient presents with     Derm Problem     Akbar has one mole of concern. He also has some skin tags that he would like to have frozen off.     Encounter Date: Dec 2, 2020    History of Present Illness:  Mr. Nicolas Mason is a 60 year old male with PMHx relevant for Essential Hypertension, ADHD, MDD (recurrent) and Left rotator cuff tendinopathy (s/p Arthroscopic repair 11/2020) who presented today for a skin check. Patient reports a lesion on his right flank (with orange-skin colored pigment) which he recalls being there for at least the last 5 years. Most recently he reports itchiness associated to it. He denies any oozing, bleeding or changes to counter (however, he thinks it may be \"thicker\"). He denies personal history history of melanoma or non-melanoma skin cancers. Unaware of dermatological  family history, since his family members were [employer] that did not seek medical care as part of their reyes.    He reports having multiple skin tags as well on his armpits and shoulders for which he uses some \"pineapple juice\" to promote sloughing of the skin.      Otherwise, patient has no additional medical concerns to discuss today.     Past Medical History:   Past Medical History:   Diagnosis Date     Hypertension     Controled with medication     Other chronic pain     Both shoulders, right elbow.      Past Surgical History:   Procedure Laterality " Date     ARTHROSCOPY SHOULDER, OPEN BICEP TENODESIS REPAIR, COMBINED Left 11/10/2020    Procedure: Left arthroscopic cuff repair, subacromial decompression, open biceps tenodesis;  Surgeon: Sariah Millan MD;  Location: [facility] OR       Social History:   reports that he has never smoked. He has never used smokeless tobacco. He reports that he does not drink alcohol or use drugs.    Family History:  No family history on file.    Medications:  Current Outpatient Medications   Medication Sig Dispense Refill     ALPRAZolam (XANAX) 0.25 MG tablet Take 0.25 mg by mouth 2 times daily       amphetamine-dextroamphetamine (ADDERALL) 10 MG tablet Take 2 tabs in the morning and 1 tab at noon       aspirin (ASA) 81 MG EC tablet Take 81 mg by mouth daily       atorvastatin (LIPITOR) 20 MG tablet        caffeine (NO-DOZE) 200 MG TABS tablet Take 200 mg by mouth daily       clonazePAM (KLONOPIN) 0.5 MG tablet Take 0.5 mg by mouth nightly as needed for anxiety       finasteride (PROSCAR) 5 MG tablet Take 2.5 mg by mouth daily       lisinopril-hydrochlorothiazide (ZESTORETIC) 20-12.5 MG tablet Take 1 tablet by mouth daily       Multiple Vitamins-Minerals (MULTIVITAMIN ADULT PO) Take 1 tablet by mouth daily       Omega-3 Fatty Acids (OMEGA-3 FISH OIL) 1200 MG CAPS Take 1,200 mg by mouth daily       potassium chloride ER (KLOR-CON M) 10 MEQ CR tablet Take 10 mEq by mouth 2 times daily       propranolol ER (INDERAL LA) 60 MG 24 hr capsule Take 60 mg by mouth daily       senna-docusate (SENOKOT-S/PERICOLACE) 8.6-50 MG tablet Take 1-2 tablets by mouth 2 times daily 30 tablet 0     sertraline (ZOLOFT) 100 MG tablet Take 200 mg by mouth daily       traZODone (DESYREL) 50 MG tablet Take 100 mg by mouth At Bedtime       Allergies   Allergen Reactions     Atorvastatin GI Disturbance     Sulfa Drugs Rash     Groin RAsh         Review of Systems:  - Skin/Heme New Pt: The patient denies frequent sun exposure. The patient denies excessive  scarring or problems healing except as per HPI.   - Constitutional: The patient denies fatigue, fevers, chills, unintended weight loss, and night sweats.  - HEENT: Patient denies nonhealing oral sores.  - Skin: As above in HPI. No additional skin concerns.  - MSK: no arthralgias or myalgias    Physical exam:  Vitals: There were no vitals taken for this visit.  GEN: This is a well developed, well-nourished male in no acute distress, in a pleasant mood.    SKIN: Reis phototype I  - Focused examination of the trunk and back was performed.  - There is a waxy stuck on tan to brown papule on the right flank.  - There are skin colored pedunculated papules on the right and left arm pits  - There is skin colored pedunculated papules with erythema on the right shoulder  - There are dilated pores with keratin filled material on his back (x3)  - No other lesions of concern on areas examined.     Impression/Plan:    1. Seborrheic keratosis, inflamed    Discussed its benign features and clinical prognosis    Cryotherapy procedure note: After verbal consent and discussion of risks and benefits including but no limited to dyspigmentation/scar, blister, and pain, was treated with 1-2mm freeze border for 2 cycles with liquid nitrogen. Post cryotherapy instructions were provided.    We will clinically monitor this area.    2. Acrochordons, irritated (on right arm pit)    Cryotherapy procedure note: After verbal consent and discussion of risks and benefits including but no limited to dyspigmentation/scar, blister, and pain, were treated with 1-2mm freeze border for 2 cycles with liquid nitrogen. Post cryotherapy instructions were provided.    We will continue to monitor clinically    3. Epidermal Inclusion Cysts/Dilated Pore of [place] on back (x3)    We will continue to monitor clinically    Follow-up in 1 years, earlier for new or changing lesions.      Case discussed with attending physician, Guillermo Denis  [facility]  Internal Medicine Residency, PGY-2  [facility]   p. [phone]      Staff Physician Comments:   I saw and evaluated the patient with the resident and I agree with the assessment and plan.  I was present for the entire minor procedure and examination.    Guillermo Benson MD  Pronouns: he/him/his    Department of Dermatology  [facility]: Phone: [phone], Fax:[phone]  [facility]: Phone: [phone] Fax: [phone]

## 2020-12-02 NOTE — NURSING NOTE
Dermatology Rooming Note    Nicolas Mason's goals for this visit include:   Chief Complaint   Patient presents with     Derm Problem     Akbar has one mole of concern. He also has some skin tags that he would like to have frozen off.     Dina Fox, CMA

## 2020-12-02 NOTE — PATIENT INSTRUCTIONS
Cryotherapy    What is it?    Use of a very cold liquid, such as liquid nitrogen, to freeze and destroy abnormal skin cells that need to be removed    What should I expect?    Tenderness and redness    A small blister that might grow and fill with dark purple blood. There may be crusting.    More than one treatment may be needed if the lesions do not go away.    How do I care for the treated area?    Gently wash the area with your hands when bathing.    Use a thin layer of Vaseline to help with healing. You may use a Band-Aid.     The area should heal within 7-10 days and may leave behind a pink or lighter color.     Do not use an antibiotic or Neosporin ointment.     You may take acetaminophen (Tylenol) for pain.     Call your Doctor if you have:    Severe pain    Signs of infection (warmth, redness, cloudy yellow drainage, and or a bad smell)    Questions or concerns    Who should I call with questions?       [facility]: [phone]       [facility]: [phone]       For urgent needs outside of business hours call the [facility] at [phone]        and ask for the dermatology resident on call

## 2020-12-09 ENCOUNTER — TELEPHONE (OUTPATIENT)
Dept: ORTHOPEDICS | Facility: CLINIC | Age: 60
End: 2020-12-09

## 2020-12-09 NOTE — TELEPHONE ENCOUNTER
[facility] Call Center    Phone Message    May a detailed message be left on voicemail: yes     Reason for Call: Other: Message is for [employer] - sunshine has self-destructed again. Already called and talked to the company. Hoping to get a replacement. Patient also has questions about pharmacy bill.     Action Taken: Message routed to:  [facility] Orthopedic    Travel Screening: Not Applicable

## 2020-12-11 NOTE — TELEPHONE ENCOUNTER
[employer] representative Alis Vincent was contacted and stated she would call the patient concerning a sling replacement.  Patient was instructed to contact his insurance company to discuss the pharmacy charge issue.

## 2020-12-21 ENCOUNTER — TELEPHONE (OUTPATIENT)
Dept: ORTHOPEDICS | Facility: CLINIC | Age: 60
End: 2020-12-21

## 2020-12-21 NOTE — TELEPHONE ENCOUNTER
Patient was encouraged to continue to do the exercises as instructed by Dr Millan at the 2 week post-op visit.  Therapy will be advanced after he is seen in clinic next week.  He requests if possible the orders be written in advance and faxed to [facility] so that he can at least schedule the future PT appointment.  Dr Millan will be consulted.

## 2020-12-21 NOTE — TELEPHONE ENCOUNTER
[facility] Call Center    Phone Message    May a detailed message be left on voicemail: yes     Reason for Call: Order(s): Other:   Reason for requested: going outside [facility] for physical therapy  Date needed: today  Provider name: Dr. Millan    Patient is requesting orders for Physical Therapy to be faxed to [facility] @ [phone].      Action Taken: Message routed to:  [facility] ortho    Travel Screening: Not Applicable

## 2020-12-30 ENCOUNTER — OFFICE VISIT (OUTPATIENT)
Dept: ORTHOPEDICS | Facility: CLINIC | Age: 60
End: 2020-12-30
Payer: COMMERCIAL

## 2020-12-30 DIAGNOSIS — M75.122 COMPLETE TEAR OF LEFT ROTATOR CUFF, UNSPECIFIED WHETHER TRAUMATIC: Primary | ICD-10-CM

## 2020-12-30 PROCEDURE — 99024 POSTOP FOLLOW-UP VISIT: CPT | Performed by: ORTHOPAEDIC SURGERY

## 2020-12-30 NOTE — LETTER
12/30/2020       RE: Nicolas Mason  [address] [place] [id number]    Dear Colleague,    Thank you for referring your patient, Nicolas Mason, to the [facility] [place]. Please see a copy of my visit note below.    Procedure: Left arthroscopic cuff repair, subacromial decompression, open biceps tenodesis   DOS: 11/10/2020    HISTORY OF PRESENT ILLNESS:  Nicolas Mason is a 60 year old male who is 6 weeks status post the above procedure. He is progressing his activities and he has wanted to do his own PT. I instructed him on exercises at his last visit. He is going to begin going to formal outpatient PT and orders were faxed.     PHYSICAL EXAM:  Adult male in no acute distress. Articulates and communicates with normal affect.  Respirations even and unlabored  Focused upper extremity exam: No asymmetry. NVI into the hand (intact EPL,FPL,Intrinsics). Assisted shoulder ROm is FE to 155 and ER to 50.    IMAGING:  No new imaging     ASSESSMENT:  1) Six weeks status post above    PLAN:    Range of Motion: Progress ROM of the shoulder with physical therapy per operative note.     Sling: Wean from sling at this time.     Pain medication: NSAIDs and Tylenol PRN    Follow up: 6 weeks with no new radiographs needed.     Sariah Millan MD

## 2020-12-30 NOTE — NURSING NOTE
Reason For Visit:   Chief Complaint   Patient presents with     Surgical Followup     7 weeks DOS 11/10/20 Left arthroscopic cuff repair, subacromial decompression, open biceps tenodesis        PCP: Jackie Guerrero  Ref: Dr. Gutierres     ?  No  Occupation [employer]   Currently working? Yes.  Work status?  Part-time.  Date of injury: gymnastics in high school and college - chronic     Date of surgery: 11/10/20  Type of surgery:   1. Left arthroscopic rotator cuff repair.   2. Left arthroscopic glenohumeral debridement, extensive  3. Left subacromial bursectomy without bony acromioplasty.   4. Left open biceps tenodesis  Smoker: No  Request smoking cessation information: No     Right hand dominant    SANE score  Affected shoulder: Left  Right shoulder SANE: 80  Left shoulder SANE: 30-40    There were no vitals taken for this visit.      Pain Assessment  Patient Currently in Pain: Yes  0-10 Pain Scale: 3  Primary Pain Location: Shoulder(Left)  Pain Descriptors: Other (comment)(inflamed)    Corinne Cm, ATC

## 2021-01-07 ENCOUNTER — TELEPHONE (OUTPATIENT)
Dept: ORTHOPEDICS | Facility: CLINIC | Age: 61
End: 2021-01-07

## 2021-01-07 NOTE — TELEPHONE ENCOUNTER
Patient was called back and his questions were answered.  He stated that he did not need the PT orders faxed at this time.  He will call back if he needs them faxed.

## 2021-01-07 NOTE — TELEPHONE ENCOUNTER
[facility] Call Center    Phone Message    May a detailed message be left on voicemail: yes     Reason for Call: Other: This pt of Dr. Millan's called to speak with someone on Dr. Millan's care team. This pt is wondering if his appt on 2/12 is indeed needed, as he just saw Dr. Millan on 12/30. Additionally, the pt wants to speak with someone on Dr. Millan's care team about getting a referral order faxed for PT. Please have someone on Dr. Millan's care team reach back out to this pt in regards to these questions.      Action Taken: Message routed to:  [facility] Ortho    Travel Screening: Not Applicable

## 2021-01-18 NOTE — PROGRESS NOTES
Procedure: Left arthroscopic cuff repair, subacromial decompression, open biceps tenodesis   DOS: 11/10/2020    HISTORY OF PRESENT ILLNESS:  Nicolas Mason is a 60 year old male who is 6 weeks status post the above procedure. He is progressing his activities and he has wanted to do his own PT. I instructed him on exercises at his last visit. He is going to begin going to formal outpatient PT and orders were faxed.     PHYSICAL EXAM:  Adult male in no acute distress. Articulates and communicates with normal affect.  Respirations even and unlabored  Focused upper extremity exam: No asymmetry. NVI into the hand (intact EPL,FPL,Intrinsics). Assisted shoulder ROm is FE to 155 and ER to 50.    IMAGING:  No new imaging     ASSESSMENT:  1) Six weeks status post above    PLAN:    Range of Motion: Progress ROM of the shoulder with physical therapy per operative note.     Sling: Wean from sling at this time.     Pain medication: NSAIDs and Tylenol PRN    Follow up: 6 weeks with no new radiographs needed.

## 2021-01-21 ENCOUNTER — DOCUMENTATION ONLY (OUTPATIENT)
Dept: CARE COORDINATION | Facility: CLINIC | Age: 61
End: 2021-01-21

## 2021-02-08 ENCOUNTER — TELEPHONE (OUTPATIENT)
Dept: ORTHOPEDICS | Facility: CLINIC | Age: 61
End: 2021-02-08

## 2021-02-08 NOTE — TELEPHONE ENCOUNTER
[facility] Call Center    Phone Message    May a detailed message be left on voicemail: yes     Reason for Call: Other: patient would like to know if its okay for him to do a video visit vs an in-person for upcoming appt. set for 02/12/21. Patient is aware may need to be a different day if doing a video visit instead. Wanted to clarify that it would be ok to do appt via video. Please call patient back at [phone]      Action Taken: Message routed to:  [facility]o    Travel Screening: Not Applicable

## 2021-02-08 NOTE — TELEPHONE ENCOUNTER
Called and spoke to pt. Explained to pt that Dr Millan has a necessary physical exam that she does during this appointment. Appt was kept at during date/time.  All questions answered.     Ashli Rodriguez ATC

## 2021-02-12 ENCOUNTER — OFFICE VISIT (OUTPATIENT)
Dept: ORTHOPEDICS | Facility: CLINIC | Age: 61
End: 2021-02-12
Payer: COMMERCIAL

## 2021-02-12 DIAGNOSIS — M75.122 COMPLETE TEAR OF LEFT ROTATOR CUFF, UNSPECIFIED WHETHER TRAUMATIC: Primary | ICD-10-CM

## 2021-02-12 PROCEDURE — 99212 OFFICE O/P EST SF 10 MIN: CPT | Performed by: ORTHOPAEDIC SURGERY

## 2021-02-12 NOTE — LETTER
2/12/2021         RE: Nicolas Mason  [facility] [address]        Dear Colleague,    Thank you for referring your patient, Nicolas Mason, to the [facility] [place]. Please see a copy of my visit note below.    Procedure: Left arthroscopic cuff repair, subacromial decompression, open biceps tenodesis   DOS: 11/10/2020    HISTORY OF PRESENT ILLNESS:  Nicolas Mason is a 60 year old male who is three months status post the above procedure. He has not wanted to do any formal PT but states he is a bit frustrated with feeling he has plateaued. He has been doing exercises I provided him but he brings in what he has been using as a thera-band which looks quite thick and not particularly pliable.     PHYSICAL EXAM:  Adult male in no acute distress. Articulates and communicates with normal affect.  Respirations even and unlabored  Focused upper extremity exam: No asymmetry. NVI into the hand (intact EPL,FPL,Intrinsics). Active left shoulder ROm is FE to 160, ER to 60 and IR to L5. His ER strength at the side is near 5/5 and FE is 4-/5.    IMAGING:  No new imaging     ASSESSMENT:  1) Three months status post above    PLAN:    Rehab: I again encouraged him that formal in person physical therapy would be helpful for him. Today he is agreeable to this and will take my prior PT referral to his local PT.    Pain medication: NSAIDs and Tylenol PRN    Follow up: 3 months but earlier if questions or concerns.        Sariah Millan MD

## 2021-02-12 NOTE — NURSING NOTE
Reason For Visit:   Chief Complaint   Patient presents with     Surgical Followup     3 month pop DOS 11/10/20 Left arthroscopic cuff repair, subacromial decompression, open biceps tenodesis        PCP: Jackie Guerrero  Ref: Dr. Gutierres     ?  No  Occupation [employer]   Currently working? Yes.  Work status?  Part-time.  Date of injury: gymnastics in high school and college - chronic     Date of surgery: 11/10/20  Type of surgery:   1. Left arthroscopic rotator cuff repair.   2. Left arthroscopic glenohumeral debridement, extensive  3. Left subacromial bursectomy without bony acromioplasty.   4. Left open biceps tenodesis  Smoker: No  Request smoking cessation information: No     Right hand dominant    SANE score  Affected shoulder: Left  Right shoulder SANE: 80  Left shoulder SANE: 50    There were no vitals taken for this visit.      Pain Assessment  Patient Currently in Pain: Sonya Cm, ATC

## 2021-02-12 NOTE — PROGRESS NOTES
Procedure: Left arthroscopic cuff repair, subacromial decompression, open biceps tenodesis   DOS: 11/10/2020    HISTORY OF PRESENT ILLNESS:  Nicolas Mason is a 60 year old male who is three months status post the above procedure. He has not wanted to do any formal PT but states he is a bit frustrated with feeling he has plateaued. He has been doing exercises I provided him but he brings in what he has been using as a thera-band which looks quite thick and not particularly pliable.     PHYSICAL EXAM:  Adult male in no acute distress. Articulates and communicates with normal affect.  Respirations even and unlabored  Focused upper extremity exam: No asymmetry. NVI into the hand (intact EPL,FPL,Intrinsics). Active left shoulder ROm is FE to 160, ER to 60 and IR to L5. His ER strength at the side is near 5/5 and FE is 4-/5.    IMAGING:  No new imaging     ASSESSMENT:  1) Three months status post above    PLAN:    Rehab: I again encouraged him that formal in person physical therapy would be helpful for him. Today he is agreeable to this and will take my prior PT referral to his local PT.    Pain medication: NSAIDs and Tylenol PRN    Follow up: 3 months but earlier if questions or concerns.

## 2021-02-25 ENCOUNTER — TRANSFERRED RECORDS (OUTPATIENT)
Dept: HEALTH INFORMATION MANAGEMENT | Facility: CLINIC | Age: 61
End: 2021-02-25

## 2021-04-07 ENCOUNTER — TELEPHONE (OUTPATIENT)
Dept: ORTHOPEDICS | Facility: CLINIC | Age: 61
End: 2021-04-07

## 2021-04-07 NOTE — TELEPHONE ENCOUNTER
Patient was called to discuss the [employer] message that was sent earlier today with the plan to move forward.  He stated that he was frustrated that we couldn't get the MRI when he first sent the message last week. He was told that Dr. Millan reviewed his case first thing this morning.  He stated that he got a call to schedule the MRI and it woke him up.  This was apologized for.  Writer did not have the opportunity to relay the information that they knew that he had a job that caused him to need to sleep late and that they did not want to wake him up as his appointment today was this afternoon at 12:30pm.  From the information in his chart it was stamped that he read the [employer] message at 11:38am.  He was waited to call as the writer was checking to see if he was going to send back a MyChart.  He was upset that he was getting a call about cancelling his appointment today 10 min before the appointment, this was also apologized for.  The plan was dicussed with him and get stated that he needs to compose himself before he can schedule.  He hung up before more explanation could be given.      The orders are placed for the MRI and he can call to schedule that at any time and there is an appointment on hold for him on 4/21/21 at 10:45am.

## 2021-04-09 ENCOUNTER — RECORDS - HEALTHEAST (OUTPATIENT)
Dept: ADMINISTRATIVE | Facility: OTHER | Age: 61
End: 2021-04-09

## 2021-04-15 ENCOUNTER — HOSPITAL ENCOUNTER (OUTPATIENT)
Dept: MRI IMAGING | Facility: HOSPITAL | Age: 61
Discharge: HOME | End: 2021-04-15

## 2021-04-15 ENCOUNTER — TRANSFERRED RECORDS (OUTPATIENT)
Dept: HEALTH INFORMATION MANAGEMENT | Facility: CLINIC | Age: 61
End: 2021-04-15

## 2021-04-15 DIAGNOSIS — M75.122 COMPLETE TEAR OF LEFT ROTATOR CUFF, UNSPECIFIED WHETHER TRAUMATIC: ICD-10-CM

## 2021-04-18 NOTE — ANESTHESIA PREPROCEDURE EVALUATION
"Anesthesia Pre-Procedure Evaluation    Patient: Nicolas Mason   MRN:     [id number] Gender:   male   Age:    60 year old :      1960        Preoperative Diagnosis: Complete tear of left rotator cuff, unspecified whether traumatic [M75.122]   Procedure(s):  Left arthroscopic cuff repair, subacromial decompression, open biceps tenodesis     LABS:  CBC: No results found for: WBC, HGB, HCT, PLT  BMP: No results found for: NA, POTASSIUM, CHLORIDE, CO2, BUN, CR, GLC  COAGS: No results found for: PTT, INR, FIBR  POC: No results found for: BGM, HCG, HCGS  OTHER: No results found for: PH, LACT, A1C, GEMA, PHOS, MAG, ALBUMIN, PROTTOTAL, ALT, AST, GGT, ALKPHOS, BILITOTAL, BILIDIRECT, LIPASE, AMYLASE, RIDDHI, TSH, T4, T3, CRP, SED     Preop Vitals    BP Readings from Last 3 Encounters:   No data found for BP    Pulse Readings from Last 3 Encounters:   No data found for Pulse      Resp Readings from Last 3 Encounters:   No data found for Resp    SpO2 Readings from Last 3 Encounters:   No data found for SpO2      Temp Readings from Last 1 Encounters:   No data found for Temp    Ht Readings from Last 1 Encounters:   20 1.778 m (5' 10\")      Wt Readings from Last 1 Encounters:   20 85.6 kg (188 lb 12.8 oz)    Estimated body mass index is 27.09 kg/m  as calculated from the following:    Height as of 20: 1.778 m (5' 10\").    Weight as of 20: 85.6 kg (188 lb 12.8 oz).     LDA:        Past Medical History:   Diagnosis Date     Hypertension     Controled with medication     Other chronic pain     Both shoulders, right elbow.       No past surgical history on file.   Allergies   Allergen Reactions     Atorvastatin GI Disturbance     Sulfa Drugs Rash     Groin RAsh        Anesthesia Evaluation     .             ROS/MED HX    ENT/Pulmonary:       Neurologic:       Cardiovascular:     (+) Dyslipidemia, hypertension----. : . . . :. .       METS/Exercise Tolerance:     Hematologic:         Musculoskeletal: " Comment: Bilateral rotator cuff tear        GI/Hepatic:         Renal/Genitourinary:         Endo:         Psychiatric:     (+) psychiatric history anxiety and depression      Infectious Disease:         Malignancy:         Other:                     ZAINABG FV AN PHYSICAL EXAM    Assessment:   ASA SCORE: 2    H&P: History and physical reviewed and following examination; no interval change.   Smoking Status:  Non-Smoker/Unknown   NPO Status: NPO Appropriate     Plan:   Anes. Type:  General; Peripheral Nerve Block; For Post-op pain in coordination with surgeon     Block Details: Interscalene; Single Shot; Exparel   Pre-Medication: None   Induction:  IV (Standard)   Airway: LMA   Access/Monitoring: PIV   Maintenance: TIVA     Postop Plan:   Postop Pain: Opioids  Postop Sedation/Airway: Not planned  Disposition: Outpatient     PONV Management:   Adult Risk Factors:, Non-Smoker, Postop Opioids   Prevention: Ondansetron, Dexamethasone, No Volatiles     CONSENT: Direct conversation   Plan and risks discussed with: Patient                      Mati Daniel MD   incontinence/increased urinary frequency incontinence/increased urinary frequency

## 2021-04-21 ENCOUNTER — OFFICE VISIT (OUTPATIENT)
Dept: ORTHOPEDICS | Facility: CLINIC | Age: 61
End: 2021-04-21
Payer: COMMERCIAL

## 2021-04-21 DIAGNOSIS — M75.122 COMPLETE TEAR OF LEFT ROTATOR CUFF, UNSPECIFIED WHETHER TRAUMATIC: Primary | ICD-10-CM

## 2021-04-21 PROCEDURE — 99213 OFFICE O/P EST LOW 20 MIN: CPT | Performed by: ORTHOPAEDIC SURGERY

## 2021-04-21 NOTE — PROGRESS NOTES
"Procedure: Left arthroscopic cuff repair, subacromial decompression, open biceps tenodesis   DOS: 11/10/2020    HISTORY OF PRESENT ILLNESS:  Nicolas Mason is a 60 year old male who is five months status post the above procedure. He comes in today to review his recent MRI. He reports he is distressed about some side effects from his Covid vaccine a few weeks ago as well as difficulty in his relationship with his son. He is upset about his progress with his shoulder. Focuses on his rehab timeline - PT llocation, recommendations for what time to do what activities. Refer to my previous notes regarding PT/rehab timeline recommendations postop.    PHYSICAL EXAM:  Adult male in no acute distress. Articulates and communicates with blunted affect.  Respirations even and unlabored on room air.  Focused upper extremity exam: No obvious asymmetry. NVI into the hand (motor EPL,FPL,[employer]) and he handles his phone, papers and bags without difficulty. Active left shoulder ROM is FE to 135, passive to 140, ER to 45 and IR to L5. He reports pain through his arc of motion but particularly at end range on passive FE and ER.    IMAGING:  Left shoulder MR without contrast dated 4/16/21 was reviewed by me and I agree with the impression below:  IMPRESSION:   1.  Large recurrent full-thickness tear of the supraspinatus tendon measuring approximately 2 x 3 cm.  2.  Tendinopathy and likely partial tearing of the infraspinatus tendon anteriorly.  3.  Mild subscapularis tendinopathy without focal tearing.  4.  Presumed previous biceps tenodesis and acromioplasty.  5.  Fraying or nondisplaced tearing of the labrum posterosuperiorly.  6.  Small glenohumeral joint effusion and small amount of fluid in the subacromial bursa.  Supra retraction is to mid humeral head (preop was retracted to glenoid rim). Current MRI describes tear as follows: \"Some thin bursal surface fibers appear to partially traverse the tear, but are likely discontinuous.\" " "Cuff tear on MRI 9/26/20 described as follows: \"Complete tear of the supraspinatus at the footprint with differential retraction of torn fibers with bursal sided fibers retracted approximately 2.4 cm from the footprint and articular sided fibers are approximately 3.9 cm.\"     ASSESSMENT:  1. Five months status post above  2. Recurrent left rotator cuff tear    PLAN:  I reviewed the new imaging with the patient. We discussed rates of recurrent tears or failure to heal after repair of large cuff tears. I discussed that revision cuff repair is an option. I am somewhat concerned about that option based on his range of motion and response to therapy. We discussed the concern for worsening of stiffness with a revision cuff repair. He is not interested at present in surgery though I discussed that tear progression (becoming larger and then possibly irreparable) can occur over time. I proposed revising his PT approach but monitoring his response to this closely with a return in at least 6 weeks. We will provide him names of other PT locations close to him at his request.  I offered him the opportunity to seek other opinions from other shoulder surgeons. I offered to provide him names in this regard and he declined other opinions today.           "

## 2021-04-21 NOTE — LETTER
4/21/2021         RE: Nicolas Mason  [address] [place] [id number]        Dear Colleague,    Thank you for referring your patient, Nicolas Mason, to the [facility] [place]. Please see a copy of my visit note below.    Procedure: Left arthroscopic cuff repair, subacromial decompression, open biceps tenodesis   DOS: 11/10/2020    HISTORY OF PRESENT ILLNESS:  Nicolas Mason is a 60 year old male who is five months status post the above procedure. He comes in today to review his recent MRI. He reports he is distressed about some side effects from his Covid vaccine a few weeks ago as well as difficulty in his relationship with his son. He is upset about his progress with his shoulder. Focuses on his rehab timeline - PT llocation, recommendations for what time to do what activities. Refer to my previous notes regarding PT/rehab timeline recommendations postop.    PHYSICAL EXAM:  Adult male in no acute distress. Articulates and communicates with blunted affect.  Respirations even and unlabored on room air.  Focused upper extremity exam: No obvious asymmetry. NVI into the hand (motor EPL,FPL,Intrinsics) and he handles his phone, papers and bags without difficulty. Active left shoulder ROM is FE to 135, passive to 140, ER to 45 and IR to L5. He reports pain through his arc of motion but particularly at end range on passive FE and ER.    IMAGING:  Left shoulder MR without contrast dated 4/16/21 was reviewed by me and I agree with the impression below:  IMPRESSION:   1.  Large recurrent full-thickness tear of the supraspinatus tendon measuring approximately 2 x 3 cm.  2.  Tendinopathy and likely partial tearing of the infraspinatus tendon anteriorly.  3.  Mild subscapularis tendinopathy without focal tearing.  4.  Presumed previous biceps tenodesis and acromioplasty.  5.  Fraying or nondisplaced tearing of the labrum posterosuperiorly.  6.  Small glenohumeral joint effusion and  "small amount of fluid in the subacromial bursa.  Supra retraction is to mid humeral head (preop was retracted to glenoid rim). Current MRI describes tear as follows: \"Some thin bursal surface fibers appear to partially traverse the tear, but are likely discontinuous.\" Cuff tear on MRI 9/26/20 described as follows: \"Complete tear of the supraspinatus at the footprint with differential retraction of torn fibers with bursal sided fibers retracted approximately 2.4 cm from the footprint and articular sided fibers are approximately 3.9 cm.\"     ASSESSMENT:  1. Five months status post above  2. Recurrent left rotator cuff tear    PLAN:  I reviewed the new imaging with the patient. We discussed rates of recurrent tears or failure to heal after repair of large cuff tears. I discussed that revision cuff repair is an option. I am somewhat concerned about that option based on his range of motion and response to therapy. We discussed the concern for worsening of stiffness with a revision cuff repair. He is not interested at present in surgery though I discussed that tear progression (becoming larger and then possibly irreparable) can occur over time. I proposed revising his PT approach but monitoring his response to this closely with a return in at least 6 weeks. We will provide him names of other PT locations close to him at his request.  I offered him the opportunity to seek other opinions from other shoulder surgeons. I offered to provide him names in this regard and he declined other opinions today.           Sariah Millan MD    "

## 2021-04-21 NOTE — NURSING NOTE
Reason For Visit:   Chief Complaint   Patient presents with     Surgical Followup     Review MRI.  s/p DOS 11/10/20 Left arthroscopic cuff repair, subacromial decompression, open biceps tenodesis        PCP: Jackie Guerrero  Ref: Dr. Gutierres     ?  No  Occupation [employer]   Currently working? Yes.  Work status?  Part-time.  Date of injury: gymnastics in high school and college - chronic     Date of surgery: 11/10/20  Type of surgery:   1. Left arthroscopic rotator cuff repair.   2. Left arthroscopic glenohumeral debridement, extensive  3. Left subacromial bursectomy without bony acromioplasty.   4. Left open biceps tenodesis  Smoker: No  Request smoking cessation information: No     Right hand dominant    SANE score  Affected shoulder: Left  Right shoulder SANE: 80  Left shoulder SANE: 30    There were no vitals taken for this visit.      Pain Assessment  Patient Currently in Pain: Yes  0-10 Pain Scale: 4  Primary Pain Location: Shoulder(Left)  Pain Descriptors: Throbbing, Other (comment)(centered)  Alleviating Factors: Rest  Aggravating Factors: Movement, Other (comment)(exercises, reaching behind, across body, elbow to belly)    Corinne Cm, ATC

## 2021-05-06 ENCOUNTER — RECORDS - HEALTHEAST (OUTPATIENT)
Dept: TELEHEALTH | Facility: CLINIC | Age: 61
End: 2021-05-06

## 2021-05-06 ENCOUNTER — COMMUNICATION - HEALTHEAST (OUTPATIENT)
Dept: FAMILY MEDICINE | Facility: CLINIC | Age: 61
End: 2021-05-06

## 2021-06-17 NOTE — TELEPHONE ENCOUNTER
Telephone Encounter by Regla Rios MD at 5/6/2021 12:44 PM     Author: Regla Rios MD Service: -- Author Type: Physician    Filed: 5/6/2021 12:44 PM Encounter Date: 5/6/2021 Status: Signed    : Regla Rios MD (Physician)    From: Nicolas Mason  Sent: 4/22/2021 11:56 PM CDT  To: Regla Rios MD  Subject: RE:schedule your covid vaccination appointment now    Can I assume this is an April Fool's Day joke? Dr. Rios? He is going to   be gone for 5 years until April fools of 2026 it says. I've received so   many of these invites but have been fully vaccinated for some time as my   primary care doctor is well aware. Pls. stop.

## 2021-09-19 ENCOUNTER — HEALTH MAINTENANCE LETTER (OUTPATIENT)
Age: 61
End: 2021-09-19

## 2022-01-09 ENCOUNTER — HEALTH MAINTENANCE LETTER (OUTPATIENT)
Age: 62
End: 2022-01-09

## 2022-11-21 ENCOUNTER — HEALTH MAINTENANCE LETTER (OUTPATIENT)
Age: 62
End: 2022-11-21

## 2023-04-16 ENCOUNTER — HEALTH MAINTENANCE LETTER (OUTPATIENT)
Age: 63
End: 2023-04-16

## 2023-09-17 ENCOUNTER — HEALTH MAINTENANCE LETTER (OUTPATIENT)
Age: 63
End: 2023-09-17

## (undated) DEVICE — BRUSH SURGICAL SCRUB W/4% CHG SOL 25ML 371073

## (undated) DEVICE — PREP DURAPREP 26ML APL 8630

## (undated) DEVICE — SU NDL MCGOWAN 1/2 1859-6D

## (undated) DEVICE — DRSG STERI STRIP 1/2X4" R1547

## (undated) DEVICE — PACK SHOULDER CUSTOM ASC SOP15ASUMA

## (undated) DEVICE — SOL NACL 0.9% IRRIG 3000ML BAG 2B7477

## (undated) DEVICE — IMM KIT SHOULDER TMAX MASK FACE 7210559

## (undated) DEVICE — GLOVE BIOGEL PI SZ 7.5 40875

## (undated) DEVICE — DRAPE ARTHROSCOPY SHOULDER BEACHCHAIR 29369

## (undated) DEVICE — BUR ARTHREX COOLCUT EXCALIBUR 4.0MMX13CM AR-8400EX

## (undated) DEVICE — TUBING SET ARTHREX DUALWAVE OUTFLOW AR-6430

## (undated) DEVICE — LINEN ORTHO PACK 5446

## (undated) DEVICE — SUCTION MANIFOLD NEPTUNE 2 SYS 4 PORT 0702-020-000

## (undated) DEVICE — NDL ECLIPSE 18GA 1.5"

## (undated) DEVICE — Device

## (undated) DEVICE — GLOVE PROTEXIS POWDER FREE SMT 7.0  2D72PT70X

## (undated) DEVICE — SOL WATER IRRIG 1000ML BOTTLE 2F7114

## (undated) DEVICE — BLADE KNIFE SURG 10 371110

## (undated) DEVICE — SYR 10ML FINGER CONTROL W/O NDL 309695

## (undated) DEVICE — DRAPE STERI U 1015

## (undated) DEVICE — BLADE KNIFE SURG 15 371115

## (undated) DEVICE — IMM KIT SHOULDER STABILIZATION 7210573

## (undated) DEVICE — SU MONOCRYL 3-0 PS-1 27" Y936H

## (undated) DEVICE — TUBING SYSTEM ARTHREX PATIENT REDEUCE AR-6421

## (undated) DEVICE — ESU CLEANER TIP 31142717

## (undated) RX ORDER — CEFAZOLIN SODIUM 1 G/3ML
INJECTION, POWDER, FOR SOLUTION INTRAMUSCULAR; INTRAVENOUS
Status: DISPENSED
Start: 2020-11-10

## (undated) RX ORDER — EPHEDRINE SULFATE 50 MG/ML
INJECTION, SOLUTION INTRAMUSCULAR; INTRAVENOUS; SUBCUTANEOUS
Status: DISPENSED
Start: 2020-11-10

## (undated) RX ORDER — FENTANYL CITRATE 50 UG/ML
INJECTION, SOLUTION INTRAMUSCULAR; INTRAVENOUS
Status: DISPENSED
Start: 2020-11-10

## (undated) RX ORDER — EPINEPHRINE NASAL SOLUTION 1 MG/ML
SOLUTION NASAL
Status: DISPENSED
Start: 2020-11-10

## (undated) RX ORDER — PROPOFOL 10 MG/ML
INJECTION, EMULSION INTRAVENOUS
Status: DISPENSED
Start: 2020-11-10

## (undated) RX ORDER — LIDOCAINE HYDROCHLORIDE 10 MG/ML
INJECTION, SOLUTION EPIDURAL; INFILTRATION; INTRACAUDAL; PERINEURAL
Status: DISPENSED
Start: 2020-07-01

## (undated) RX ORDER — GLYCOPYRROLATE 0.2 MG/ML
INJECTION INTRAMUSCULAR; INTRAVENOUS
Status: DISPENSED
Start: 2020-11-10

## (undated) RX ORDER — FENTANYL CITRATE-0.9 % NACL/PF 10 MCG/ML
PLASTIC BAG, INJECTION (ML) INTRAVENOUS
Status: DISPENSED
Start: 2020-11-10

## (undated) RX ORDER — BUPIVACAINE HYDROCHLORIDE 2.5 MG/ML
INJECTION, SOLUTION EPIDURAL; INFILTRATION; INTRACAUDAL
Status: DISPENSED
Start: 2020-11-10

## (undated) RX ORDER — TRIAMCINOLONE ACETONIDE 40 MG/ML
INJECTION, SUSPENSION INTRA-ARTICULAR; INTRAMUSCULAR
Status: DISPENSED
Start: 2020-07-01

## (undated) RX ORDER — LIDOCAINE HYDROCHLORIDE 20 MG/ML
INJECTION, SOLUTION EPIDURAL; INFILTRATION; INTRACAUDAL; PERINEURAL
Status: DISPENSED
Start: 2020-11-10

## (undated) RX ORDER — KETOROLAC TROMETHAMINE 30 MG/ML
INJECTION, SOLUTION INTRAMUSCULAR; INTRAVENOUS
Status: DISPENSED
Start: 2020-11-10

## (undated) RX ORDER — ONDANSETRON 2 MG/ML
INJECTION INTRAMUSCULAR; INTRAVENOUS
Status: DISPENSED
Start: 2020-11-10

## (undated) RX ORDER — GABAPENTIN 300 MG/1
CAPSULE ORAL
Status: DISPENSED
Start: 2020-11-10

## (undated) RX ORDER — LIDOCAINE HYDROCHLORIDE AND EPINEPHRINE 10; 10 MG/ML; UG/ML
INJECTION, SOLUTION INFILTRATION; PERINEURAL
Status: DISPENSED
Start: 2020-11-10

## (undated) RX ORDER — ACETAMINOPHEN 325 MG/1
TABLET ORAL
Status: DISPENSED
Start: 2020-11-10